# Patient Record
Sex: FEMALE | Race: WHITE | Employment: OTHER | URBAN - METROPOLITAN AREA
[De-identification: names, ages, dates, MRNs, and addresses within clinical notes are randomized per-mention and may not be internally consistent; named-entity substitution may affect disease eponyms.]

---

## 2018-05-26 LAB
ABSOL LYMPHOCYTES (HISTORICAL): 1.6 K/UL (ref 0.5–4)
ALBUMIN SERPL BCP-MCNC: 4.1 G/DL (ref 3–5.2)
ALP SERPL-CCNC: 112 U/L (ref 43–122)
ALT SERPL W P-5'-P-CCNC: 22 U/L (ref 9–52)
ANION GAP SERPL CALCULATED.3IONS-SCNC: 13 MMOL/L (ref 5–14)
AST SERPL W P-5'-P-CCNC: 18 U/L (ref 14–36)
BASOPHILS # BLD AUTO: 0 K/UL (ref 0–0.1)
BASOPHILS # BLD AUTO: 1 % (ref 0–1)
BILIRUB SERPL-MCNC: 0.5 MG/DL
BUN SERPL-MCNC: 9 MG/DL (ref 5–25)
CALCIUM SERPL-MCNC: 10.1 MG/DL (ref 8.4–10.2)
CHLORIDE SERPL-SCNC: 104 MEQ/L (ref 97–108)
CHOLEST SERPL-MCNC: 178 MG/DL
CHOLEST/HDLC SERPL: 2.6 {RATIO}
CO2 SERPL-SCNC: 25 MMOL/L (ref 22–30)
CREATINE, SERUM (HISTORICAL): 0.86 MG/DL (ref 0.6–1.2)
DEPRECATED RDW RBC AUTO: 14.9 %
EGFR (HISTORICAL): >60 ML/MIN/1.73 M2
EOSINOPHIL # BLD AUTO: 0.2 K/UL (ref 0–0.4)
EOSINOPHIL NFR BLD AUTO: 4 % (ref 0–6)
GLUCOSE FASTING (HISTORICAL): 96 MG/DL (ref 70–99)
HCT VFR BLD AUTO: 42.4 % (ref 36–46)
HDLC SERPL-MCNC: 68 MG/DL
HGB BLD-MCNC: 13.9 G/DL (ref 12–16)
LDL/HDL RATIO (HISTORICAL): 1.2
LDLC SERPL CALC-MCNC: 82 MG/DL
LYMPHOCYTES NFR BLD AUTO: 27 % (ref 25–45)
MCH RBC QN AUTO: 27.8 PG (ref 26–34)
MCHC RBC AUTO-ENTMCNC: 32.9 % (ref 31–36)
MCV RBC AUTO: 85 FL (ref 80–100)
MONOCYTES # BLD AUTO: 0.5 K/UL (ref 0.2–0.9)
MONOCYTES NFR BLD AUTO: 8 % (ref 1–10)
NEUTROPHILS ABS COUNT (HISTORICAL): 3.6 K/UL (ref 1.8–7.8)
NEUTS SEG NFR BLD AUTO: 60 % (ref 45–65)
PLATELET # BLD AUTO: 301 K/MCL (ref 150–450)
POTASSIUM SERPL-SCNC: 4.1 MEQ/L (ref 3.6–5)
RBC # BLD AUTO: 5.01 M/MCL (ref 4–5.2)
SODIUM SERPL-SCNC: 142 MEQ/L (ref 137–147)
TOTAL PROTEIN (HISTORICAL): 7.2 G/DL (ref 5.9–8.4)
TRIGL SERPL-MCNC: 138 MG/DL
TSH SERPL DL<=0.05 MIU/L-ACNC: 1.2 UIU/ML (ref 0.47–4.68)
VITAMIN D25-HYDROXY (HISTORICAL): 36.2 NG/ML (ref 30–100)
VLDLC SERPL CALC-MCNC: 28 MG/DL (ref 0–40)
WBC # BLD AUTO: 6 K/MCL (ref 4.5–11)

## 2018-12-07 ENCOUNTER — OFFICE VISIT (OUTPATIENT)
Dept: FAMILY MEDICINE CLINIC | Facility: CLINIC | Age: 78
End: 2018-12-07
Payer: COMMERCIAL

## 2018-12-07 VITALS
WEIGHT: 222 LBS | SYSTOLIC BLOOD PRESSURE: 142 MMHG | TEMPERATURE: 97.7 F | DIASTOLIC BLOOD PRESSURE: 76 MMHG | RESPIRATION RATE: 16 BRPM | HEART RATE: 109 BPM | OXYGEN SATURATION: 96 % | HEIGHT: 62 IN | BODY MASS INDEX: 40.85 KG/M2

## 2018-12-07 DIAGNOSIS — E55.9 VITAMIN D INSUFFICIENCY: ICD-10-CM

## 2018-12-07 DIAGNOSIS — Z23 IMMUNIZATION DUE: ICD-10-CM

## 2018-12-07 DIAGNOSIS — Z00.00 HEALTHCARE MAINTENANCE: ICD-10-CM

## 2018-12-07 DIAGNOSIS — K21.9 GASTROESOPHAGEAL REFLUX DISEASE WITHOUT ESOPHAGITIS: ICD-10-CM

## 2018-12-07 DIAGNOSIS — F41.9 ANXIETY: Primary | ICD-10-CM

## 2018-12-07 DIAGNOSIS — E78.49 OTHER HYPERLIPIDEMIA: ICD-10-CM

## 2018-12-07 PROBLEM — J30.9 ALLERGIC RHINITIS: Status: ACTIVE | Noted: 2018-12-07

## 2018-12-07 PROBLEM — M19.90 OSTEOARTHRITIS: Status: ACTIVE | Noted: 2018-12-07

## 2018-12-07 PROCEDURE — 1160F RVW MEDS BY RX/DR IN RCRD: CPT | Performed by: FAMILY MEDICINE

## 2018-12-07 PROCEDURE — G0008 ADMIN INFLUENZA VIRUS VAC: HCPCS

## 2018-12-07 PROCEDURE — 99214 OFFICE O/P EST MOD 30 MIN: CPT | Performed by: FAMILY MEDICINE

## 2018-12-07 PROCEDURE — 1125F AMNT PAIN NOTED PAIN PRSNT: CPT

## 2018-12-07 PROCEDURE — G0439 PPPS, SUBSEQ VISIT: HCPCS | Performed by: FAMILY MEDICINE

## 2018-12-07 PROCEDURE — 90662 IIV NO PRSV INCREASED AG IM: CPT

## 2018-12-07 PROCEDURE — 4040F PNEUMOC VAC/ADMIN/RCVD: CPT

## 2018-12-07 PROCEDURE — 3008F BODY MASS INDEX DOCD: CPT | Performed by: FAMILY MEDICINE

## 2018-12-07 PROCEDURE — 1170F FXNL STATUS ASSESSED: CPT

## 2018-12-07 PROCEDURE — 1160F RVW MEDS BY RX/DR IN RCRD: CPT

## 2018-12-07 RX ORDER — ALPRAZOLAM 0.25 MG/1
0.25 TABLET ORAL 2 TIMES DAILY
Qty: 120 TABLET | Refills: 0 | Status: SHIPPED | OUTPATIENT
Start: 2018-12-07 | End: 2019-06-27 | Stop reason: SDUPTHER

## 2018-12-07 RX ORDER — ZOLPIDEM TARTRATE 10 MG/1
TABLET ORAL
COMMUNITY
End: 2018-12-07 | Stop reason: ALTCHOICE

## 2018-12-07 RX ORDER — PAROXETINE HYDROCHLORIDE 40 MG/1
TABLET, FILM COATED ORAL EVERY 24 HOURS
COMMUNITY
Start: 2018-04-16 | End: 2019-11-16 | Stop reason: SDUPTHER

## 2018-12-07 RX ORDER — HYDROCODONE BITARTRATE AND ACETAMINOPHEN 7.5; 325 MG/1; MG/1
TABLET ORAL EVERY 6 HOURS
COMMUNITY
Start: 2015-07-02

## 2018-12-07 RX ORDER — ALPRAZOLAM 0.25 MG/1
TABLET ORAL
COMMUNITY
Start: 2018-06-01 | End: 2018-12-07 | Stop reason: SDUPTHER

## 2018-12-07 RX ORDER — MAG HYDROX/ALUMINUM HYD/SIMETH 400-400-40
SUSPENSION, ORAL (FINAL DOSE FORM) ORAL
COMMUNITY

## 2018-12-07 RX ORDER — ERGOCALCIFEROL (VITAMIN D2) 1250 MCG
CAPSULE ORAL
COMMUNITY
Start: 2017-05-23 | End: 2018-12-07 | Stop reason: ALTCHOICE

## 2018-12-07 RX ORDER — ESOMEPRAZOLE MAGNESIUM 40 MG/1
CAPSULE, DELAYED RELEASE ORAL EVERY 24 HOURS
COMMUNITY
Start: 2015-02-11 | End: 2021-12-17 | Stop reason: ALTCHOICE

## 2018-12-07 RX ORDER — ROSUVASTATIN CALCIUM 10 MG/1
TABLET, COATED ORAL EVERY 24 HOURS
COMMUNITY
Start: 2018-06-08 | End: 2019-08-05 | Stop reason: SDUPTHER

## 2018-12-07 NOTE — PROGRESS NOTES
Assessment and Plan:  Problem List Items Addressed This Visit     Anxiety - Primary    Relevant Medications    ALPRAZolam (XANAX) 0 25 mg tablet    Other Relevant Orders    CBC and differential    Comprehensive metabolic panel    TSH, 3rd generation with Free T4 reflex    Gastroesophageal reflux disease    Relevant Medications    esomeprazole (NEXIUM) 40 MG capsule    Hyperlipidemia    Relevant Medications    rosuvastatin (CRESTOR) 10 MG tablet    Other Relevant Orders    Lipid Panel with Direct LDL reflex    Immunization due    Relevant Orders    PREFERRED: influenza vaccine, 4791-9834, high-dose, PF 0 5 mL, for patients 65 yr+ (FLUZONE HIGH-DOSE) (Completed)    Vitamin D insufficiency    Relevant Orders    Vitamin D 25 hydroxy    Healthcare maintenance        Health Maintenance Due   Topic Date Due    DTaP,Tdap,and Td Vaccines (1 - Tdap) 06[birth year]/1963    Fall Risk  06/09/2007    Urinary Incontinence Screening  06/09/2007    Pneumococcal PPSV23/PCV13 65+ Years / Low and Medium Risk (2 of 2 - PCV13) 10/30/2009         HPI:  Patient Active Problem List   Diagnosis    Allergic rhinitis    Anxiety    Back pain    Cervical polyp    Chronic pain disorder    Depression    Osteoarthritis    Gastroesophageal reflux disease    Hemorrhoids    Hiatal hernia    Hyperlipidemia    Lumbar degenerative disc disease    Obesity    Osteoporosis    Sjogren's syndrome ([address] )    Sleep apnea    Tachycardia    Urge incontinence    Uterovaginal prolapse, incomplete    Immunization due    Vitamin D insufficiency    Healthcare maintenance     Past Medical History:   Diagnosis Date    Anxiety     Depression     GERD (gastroesophageal reflux disease)     Hyperlipidemia     Kidney stone     Vitamin D deficiency      Past Surgical History:   Procedure Laterality Date    CATARACT EXTRACTION      CYSTOTOMY      with direct removal of calculus    HEMORROIDECTOMY  2010    KIDNEY STONE SURGERY      requiring open removal on the left side    LITHOTRIPSY      POLYPECTOMY      REPLACEMENT TOTAL KNEE  2011    SINUS SURGERY      TONSILLECTOMY AND ADENOIDECTOMY       Family History   Problem Relation Age of Onset    Heart attack Mother     Diverticulosis Father     Other Brother         back surgery and one brain surgery    Asthma Daughter      History   Smoking Status    Former Smoker    Types: Cigarettes    Quit date: 1995   Smokeless Tobacco    Never Used     Comment: no passive smoke exposure     History   Alcohol Use No      History   Drug Use No         Current Outpatient Prescriptions   Medication Sig Dispense Refill    ALPRAZolam (XANAX) 0 25 mg tablet Take 1 tablet (0 25 mg total) by mouth 2 (two) times a day 120 tablet 0    Cholecalciferol (VITAMIN D3) 5000 units CAPS Take by mouth      Ergocalciferol (VITAMIN D2 PO) Take 1 tablet by mouth      esomeprazole (NEXIUM) 40 MG capsule every 24 hours      HYDROcodone-acetaminophen (NORCO) 7 5-325 mg per tablet every 6 (six) hours      PARoxetine (PAXIL) 40 MG tablet every 24 hours      rosuvastatin (CRESTOR) 10 MG tablet every 24 hours       No current facility-administered medications for this visit  Allergies   Allergen Reactions    Ciprofloxacin      Other reaction(s): headache,dizziness, chest pain    Acetaminophen      Pt states she currently takes without issue    Propoxyphene      Immunization History   Administered Date(s) Administered    Influenza TIV (IM) 10/12/2010    Influenza, high dose seasonal 0 5 mL 12/07/2018    Pneumococcal Polysaccharide PPV23 02/12/1999, 10/30/2008       Patient Care Team:  Courtney Betancur MD as PCP - General (Family Medicine)  Joan Chakraborty MD    Medicare Screening Tests and Risk Assessments:  Arian So is here for her Subsequent Wellness visit  Last Medicare Wellness visit information reviewed, patient interviewed and updates made to the record today       Health Risk Assessment:  Patient rates overall health as good  Patient feels that their physical health rating is Same  Eyesight was rated as Same  Hearing was rated as Same  Patient feels that their emotional and mental health rating is Same  Pain experienced by patient in the last 7 days has been Some  Patient's pain rating has been 2/10  Patient states that she has experienced no weight loss or gain in last 6 months  (Additional comments: Pt's  starting cancer treatments this week)    Emotional/Mental Health:  Patient has been feeling nervous/anxious  PHQ-9 Depression Screening:    Frequency of the following problems over the past two weeks:      1  Little interest or pleasure in doing things: 3 - nearly every day      2  Feeling down, depressed, or hopeless: 3 - nearly every day      3  Trouble falling or staying asleep, or sleeping too much: 3 - nearly every day      4  Feeling tired or having little energy: 3 - nearly every day      5  Poor appetite or overeatin - more than half the days      6  Feeling bad about yourself - or that you are a failure or have let yourself or your family down: 3 - nearly every day      7  Trouble concentrating on things, such as reading the newspaper or watching television: 0 - not at all      8  Moving or speaking so slowly that other people could have noticed  Or the opposite - being so fidgety or restless that you have been moving around a lot more than usual: 0 - not at all      9  Thoughts that you would be better off dead, or of hurting yourself in some way: 0 - not at all  PHQ-2 Score: 6  PHQ-9 Score: 17    Broken Bones/Falls: Fall Risk Assessment:    In the past year, patient has experienced: History of falling in past year     Number of falls: 1  Patient feels unsteady standing  Patient is not taking medication that can cause feelings of lightheadedness or tiredness  Patient often has no need to rush to the toilet  Chronic conditions that may contribute to falls arthritis       Bladder/Bowel:  Patient has not leaked urine accidently in the last six months  Patient reports no loss of bowel control  Immunizations:  Patient has had a flu vaccination within the last year  Patient has received a pneumonia shot  Patient has received a shingles shot  Home Safety:  Patient has trouble with stairs inside or outside of their home  Patient currently reports that there are no safety hazards present in home, working smoke alarms, working carbon monoxide detectors  Preventative Screenings:   Breast cancer screening performed, colon cancer screen completed, cholesterol screen completed, glaucoma eye exam completed,     Nutrition:  Current diet: Regular with servings of the following:    Medications:  Patient is not currently taking any over-the-counter supplements  Patient is able to manage medications  (Additional Comments:  manages meds)Lifestyle Choices:  Patient reports no tobacco use  Patient has not smoked or used tobacco in the past   Patient reports no alcohol use  Patient does not drive a vehicle  Patient wears seat belt  Current level of exercise of physical activity described by patient as:  none  Activities of Daily Living:  Can get out of bed by his or her self, able to dress self, able to make own meals, able to do own shopping, able to bathe self, can do own laundry/housekeeping, can manage own money, pay bills and track expenses    Previous Hospitalizations:  No hospitalization or ED visit in past 12 months        Advanced Directives:  Patient has decided on a power of   Patient has spoken to designated power of   Patient has completed advanced directive          Preventative Screening/Counseling:      Cardiovascular:      General: Risks and Benefits Discussed and Screening Current          Diabetes:      General: Risks and Benefits Discussed and Screening Current          Colorectal Cancer:      General: Risks and Benefits Discussed      Counseling: high fiber diet          Breast Cancer:      General: Risks and Benefits Discussed          Cervical Cancer:      General: Risks and Benefits Discussed and Patient Declines          Osteoporosis:      General: Risks and Benefits Discussed and Patient Declines          AAA:      General: Risks and Benefits Discussed and Patient Declines          Glaucoma:      General: Risks and Benefits Discussed and Screening Current          HIV:      General: Patient Declines and Risks and Benefits Discussed          Hepatitis C:      General: Risks and Benefits Discussed        Advanced Directives:   Patient has living will for healthcare, has durable POA for healthcare, patient has an advanced directive  No exam data present    Physical Exam:  Review of Systems   Gastrointestinal: Negative for bowel incontinence  Musculoskeletal: Positive for arthritis  Psychiatric/Behavioral: The patient is nervous/anxious  Vitals:    12/07/18 1148   BP: 142/76   BP Location: Right arm   Patient Position: Sitting   Cuff Size: Large   Pulse: (!) 109   Resp: 16   Temp: 97 7 °F (36 5 °C)   TempSrc: Tympanic   SpO2: 96%   Weight: 101 kg (222 lb)   Height: 5' 2" (1 575 m)   Body mass index is 40 6 kg/m²      Physical Exam

## 2018-12-07 NOTE — PROGRESS NOTES
Assessment/Plan:     Diagnoses and all orders for this visit:    Anxiety  Comments:  Stable  Continue same  Will continue to monitor  Orders:  -     ALPRAZolam (XANAX) 0 25 mg tablet; Take 1 tablet (0 25 mg total) by mouth 2 (two) times a day  -     CBC and differential; Future  -     Comprehensive metabolic panel; Future  -     TSH, 3rd generation with Free T4 reflex; Future    Gastroesophageal reflux disease without esophagitis  Comments:  Stable  Continue same  Will continue to monitor    Immunization due  -     PREFERRED: influenza vaccine, 1555-3310, high-dose, PF 0 5 mL, for patients 65 yr+ (FLUZONE HIGH-DOSE)    Other hyperlipidemia  Comments: It was discussed about low-fat diet  We will continue to monitor her fasting lipid profile  Orders:  -     Lipid Panel with Direct LDL reflex; Future    Vitamin D insufficiency  Comments:  Continue same  Will continue to monitor  Orders:  -     Vitamin D 25 hydroxy; Future    Healthcare maintenance  Comments: It was discussed about immunizations, diet, exercise and safety measures    Other orders  -     Discontinue: ALPRAZolam (XANAX) 0 25 mg tablet; take 1 tablet by oral route twice a day  -     rosuvastatin (CRESTOR) 10 MG tablet; every 24 hours  -     Discontinue: ergocalciferol (ERGOCALCIFEROL) 91915 units capsule; take 1 capsule by oral route  every week  -     HYDROcodone-acetaminophen (NORCO) 7 5-325 mg per tablet; every 6 (six) hours  -     esomeprazole (NEXIUM) 40 MG capsule; every 24 hours  -     PARoxetine (PAXIL) 40 MG tablet; every 24 hours  -     Discontinue: zolpidem (AMBIEN) 10 mg tablet; Take by mouth  -     Cholecalciferol (VITAMIN D3) 5000 units CAPS; Take by mouth  -     Ergocalciferol (VITAMIN D2 PO); Take 1 tablet by mouth          There are no Patient Instructions on file for this visit  Return in about 6 months (around 6/7/2019)  Subjective:      Patient ID: Amee Garcia is a 70 y o  female      Chief Complaint   Patient presents with   [facility] Wellness Visit       She is here today for follow-up multiple medical problems  She has been taking all her medications  She denies any side effects from her medications  The following portions of the patient's history were reviewed and updated as appropriate: allergies, current medications, past family history, past medical history, past social history, past surgical history and problem list     Review of Systems   Constitutional: Negative for chills and fever  HENT: Negative for trouble swallowing  Eyes: Negative for visual disturbance  Respiratory: Negative for cough and shortness of breath  Cardiovascular: Negative for chest pain, palpitations and leg swelling  Gastrointestinal: Negative for abdominal pain, constipation and diarrhea  Endocrine: Negative for cold intolerance and heat intolerance  Genitourinary: Negative for difficulty urinating and dysuria  Musculoskeletal: Negative for gait problem  Skin: Negative for rash  Neurological: Negative for dizziness, tremors, seizures and headaches  Hematological: Negative for adenopathy  Psychiatric/Behavioral: Negative for behavioral problems  Current Outpatient Prescriptions   Medication Sig Dispense Refill    ALPRAZolam (XANAX) 0 25 mg tablet Take 1 tablet (0 25 mg total) by mouth 2 (two) times a day 120 tablet 0    Cholecalciferol (VITAMIN D3) 5000 units CAPS Take by mouth      Ergocalciferol (VITAMIN D2 PO) Take 1 tablet by mouth      esomeprazole (NEXIUM) 40 MG capsule every 24 hours      HYDROcodone-acetaminophen (NORCO) 7 5-325 mg per tablet every 6 (six) hours      PARoxetine (PAXIL) 40 MG tablet every 24 hours      rosuvastatin (CRESTOR) 10 MG tablet every 24 hours       No current facility-administered medications for this visit          Objective:    /76 (BP Location: Right arm, Patient Position: Sitting, Cuff Size: Large)   Pulse (!) 109   Temp 97 7 °F (36 5 °C) (Tympanic)   Resp 16   Ht 5' 2" (1 575 m)   Wt 101 kg (222 lb)   SpO2 96%   BMI 40 60 kg/m²        Physical Exam   Constitutional: She is oriented to person, place, and time  She appears well-developed and well-nourished  HENT:   Head: Normocephalic and atraumatic  Eyes: Pupils are equal, round, and reactive to light  EOM are normal    Neck: Normal range of motion  Neck supple  Cardiovascular: Normal rate, regular rhythm and normal heart sounds  Pulmonary/Chest: Effort normal and breath sounds normal    Abdominal: Soft  Bowel sounds are normal    Musculoskeletal: Normal range of motion  She exhibits no edema  Lymphadenopathy:     She has no cervical adenopathy  Neurological: She is alert and oriented to person, place, and time  No cranial nerve deficit  Skin: Skin is warm  Psychiatric: She has a normal mood and affect  Nursing note and vitals reviewed               Silvino Samano MD

## 2019-03-13 ENCOUNTER — OFFICE VISIT (OUTPATIENT)
Dept: PHYSICAL THERAPY | Facility: OTHER | Age: 79
End: 2019-03-13

## 2019-03-13 DIAGNOSIS — G89.29 CHRONIC BILATERAL LOW BACK PAIN, WITH SCIATICA PRESENCE UNSPECIFIED: Primary | ICD-10-CM

## 2019-03-13 DIAGNOSIS — M54.5 CHRONIC BILATERAL LOW BACK PAIN, WITH SCIATICA PRESENCE UNSPECIFIED: Primary | ICD-10-CM

## 2019-03-14 NOTE — PROGRESS NOTES
Patient came into PT without a script  Patient was going to be part of the Comp [facility]ine Program   However upon speaking with the patient and her  PT decided not to charge this visit  Essentially it turned into an information session about EPAT and other possible actions that can be taken to get the patient more active  Patient's  is very concerned about her health and he wants her to be able to get out of the house more

## 2019-06-27 DIAGNOSIS — F41.9 ANXIETY: ICD-10-CM

## 2019-06-28 ENCOUNTER — OFFICE VISIT (OUTPATIENT)
Dept: FAMILY MEDICINE CLINIC | Facility: CLINIC | Age: 79
End: 2019-06-28
Payer: COMMERCIAL

## 2019-06-28 VITALS
WEIGHT: 209 LBS | SYSTOLIC BLOOD PRESSURE: 122 MMHG | BODY MASS INDEX: 38.46 KG/M2 | RESPIRATION RATE: 16 BRPM | DIASTOLIC BLOOD PRESSURE: 72 MMHG | HEART RATE: 82 BPM | TEMPERATURE: 98.2 F | OXYGEN SATURATION: 99 % | HEIGHT: 62 IN

## 2019-06-28 DIAGNOSIS — F32.89 OTHER DEPRESSION: Primary | ICD-10-CM

## 2019-06-28 DIAGNOSIS — Z23 ENCOUNTER FOR IMMUNIZATION: ICD-10-CM

## 2019-06-28 DIAGNOSIS — E53.8 VITAMIN B12 DEFICIENCY: ICD-10-CM

## 2019-06-28 DIAGNOSIS — G89.4 CHRONIC PAIN DISORDER: ICD-10-CM

## 2019-06-28 DIAGNOSIS — E78.2 MIXED HYPERLIPIDEMIA: ICD-10-CM

## 2019-06-28 DIAGNOSIS — R41.3 MEMORY DEFICIT: ICD-10-CM

## 2019-06-28 PROBLEM — F19.90 CURRENT DRUG USE: Status: ACTIVE | Noted: 2017-09-12

## 2019-06-28 PROCEDURE — 1036F TOBACCO NON-USER: CPT | Performed by: FAMILY MEDICINE

## 2019-06-28 PROCEDURE — 90471 IMMUNIZATION ADMIN: CPT

## 2019-06-28 PROCEDURE — 90715 TDAP VACCINE 7 YRS/> IM: CPT

## 2019-06-28 PROCEDURE — 3725F SCREEN DEPRESSION PERFORMED: CPT | Performed by: FAMILY MEDICINE

## 2019-06-28 PROCEDURE — 1160F RVW MEDS BY RX/DR IN RCRD: CPT | Performed by: FAMILY MEDICINE

## 2019-06-28 PROCEDURE — 99214 OFFICE O/P EST MOD 30 MIN: CPT | Performed by: FAMILY MEDICINE

## 2019-06-28 RX ORDER — ALPRAZOLAM 0.25 MG/1
TABLET ORAL
Qty: 120 TABLET | Refills: 0 | Status: SHIPPED | OUTPATIENT
Start: 2019-06-28 | End: 2020-05-08 | Stop reason: SDUPTHER

## 2019-08-05 DIAGNOSIS — E78.2 MIXED HYPERLIPIDEMIA: Primary | ICD-10-CM

## 2019-08-07 RX ORDER — ROSUVASTATIN CALCIUM 10 MG/1
10 TABLET, COATED ORAL DAILY
Qty: 90 TABLET | Refills: 0 | Status: SHIPPED | OUTPATIENT
Start: 2019-08-07 | End: 2019-11-16 | Stop reason: SDUPTHER

## 2019-11-15 DIAGNOSIS — F32.89 OTHER DEPRESSION: Primary | ICD-10-CM

## 2019-11-15 NOTE — TELEPHONE ENCOUNTER
Message for refill of paroxetine which is now 20mg per Dr Elieser Ramachandran  They were cutting the 40 in half and rosuvastatin

## 2019-11-16 DIAGNOSIS — E78.2 MIXED HYPERLIPIDEMIA: ICD-10-CM

## 2019-11-16 RX ORDER — ROSUVASTATIN CALCIUM 10 MG/1
10 TABLET, COATED ORAL DAILY
Qty: 90 TABLET | Refills: 0 | Status: SHIPPED | OUTPATIENT
Start: 2019-11-16 | End: 2020-03-17

## 2019-11-16 RX ORDER — PAROXETINE HYDROCHLORIDE 40 MG/1
20 TABLET, FILM COATED ORAL EVERY 24 HOURS
Qty: 30 TABLET | Refills: 0 | Status: SHIPPED | OUTPATIENT
Start: 2019-11-16 | End: 2019-11-19 | Stop reason: SDUPTHER

## 2019-11-16 NOTE — TELEPHONE ENCOUNTER
rx sent to provider  Please note dose change from 40mg to 20mg per Dr Cletus Primrose note  Pt last seen 6/28/19 and is due for f/u appt  l/m for pt to call office to sched f/u appt

## 2019-11-18 ENCOUNTER — TELEPHONE (OUTPATIENT)
Dept: FAMILY MEDICINE CLINIC | Facility: CLINIC | Age: 79
End: 2019-11-18

## 2019-11-18 NOTE — TELEPHONE ENCOUNTER
Pt paroxetine needs an autorization   I did go onto cover my meds and sent a prior auth to [facility] for approval

## 2019-11-19 ENCOUNTER — OFFICE VISIT (OUTPATIENT)
Dept: FAMILY MEDICINE CLINIC | Facility: CLINIC | Age: 79
End: 2019-11-19
Payer: COMMERCIAL

## 2019-11-19 VITALS
RESPIRATION RATE: 16 BRPM | HEIGHT: 62 IN | BODY MASS INDEX: 39.75 KG/M2 | HEART RATE: 76 BPM | TEMPERATURE: 97.9 F | SYSTOLIC BLOOD PRESSURE: 118 MMHG | WEIGHT: 216 LBS | OXYGEN SATURATION: 97 % | DIASTOLIC BLOOD PRESSURE: 72 MMHG

## 2019-11-19 DIAGNOSIS — E78.2 MIXED HYPERLIPIDEMIA: ICD-10-CM

## 2019-11-19 DIAGNOSIS — F41.1 ANXIETY STATE: ICD-10-CM

## 2019-11-19 DIAGNOSIS — M51.37 DEGENERATION OF LUMBOSACRAL INTERVERTEBRAL DISC: ICD-10-CM

## 2019-11-19 DIAGNOSIS — F32.89 OTHER DEPRESSION: Primary | ICD-10-CM

## 2019-11-19 DIAGNOSIS — K21.9 GASTROESOPHAGEAL REFLUX DISEASE WITHOUT ESOPHAGITIS: ICD-10-CM

## 2019-11-19 PROCEDURE — 1160F RVW MEDS BY RX/DR IN RCRD: CPT | Performed by: FAMILY MEDICINE

## 2019-11-19 PROCEDURE — 99214 OFFICE O/P EST MOD 30 MIN: CPT | Performed by: FAMILY MEDICINE

## 2019-11-19 PROCEDURE — 1036F TOBACCO NON-USER: CPT | Performed by: FAMILY MEDICINE

## 2019-11-19 RX ORDER — PAROXETINE 10 MG/1
20 TABLET, FILM COATED ORAL EVERY 24 HOURS
Qty: 60 TABLET | Refills: 3 | Status: SHIPPED | OUTPATIENT
Start: 2019-11-19 | End: 2020-04-19

## 2019-11-19 NOTE — ASSESSMENT & PLAN NOTE
Stable on Paxil 20 mg daily  I am going to cut down to 10 mg  She was told to try to taper it down to 10 mg daily by alternating 20 and then every other day for 2 weeks and then 10 mg daily  Come back in 1 month

## 2019-11-19 NOTE — PROGRESS NOTES
Assessment/Plan:  Depression  Stable on Paxil 20 mg daily  I am going to cut down to 10 mg  She was told to try to taper it down to 10 mg daily by alternating 20 and then every other day for 2 weeks and then 10 mg daily  Come back in 1 month  Hyperlipidemia  Stable  Continue same  It was discussed about low-fat diet  Will continue to monitor  Anxiety state  Stable  Continue same  Will continue to monitor  Degeneration of lumbosacral intervertebral disc  Stable  Continue same  We will continue to monitor  Diagnoses and all orders for this visit:    Other depression  -     PARoxetine (PAXIL) 10 mg tablet; Take 2 tablets (20 mg total) by mouth every 24 hours    Gastroesophageal reflux disease without esophagitis    Mixed hyperlipidemia    Degeneration of lumbosacral intervertebral disc    Anxiety state          There are no Patient Instructions on file for this visit  Return in about 23 days (around 12/12/2019)  Subjective:      Patient ID: Castillo Herzog is a 70 y o  female  Chief Complaint   Patient presents with    Depression    Hyperlipidemia    GERD       For follow-up multiple medical problems  She has been taking her medications  She has been cutting down on her Paxil to 20 mg daily  She has been doing well on the new dose  She has been more active since she cut down on her dose  Denies feeling depressed  She did not get her blood work done yet  The following portions of the patient's history were reviewed and updated as appropriate: allergies, current medications, past family history, past medical history, past social history, past surgical history and problem list     Review of Systems   Constitutional: Negative for chills and fever  HENT: Negative for trouble swallowing  Eyes: Negative for visual disturbance  Respiratory: Negative for cough and shortness of breath  Cardiovascular: Negative for chest pain, palpitations and leg swelling  Gastrointestinal: Negative for abdominal pain, constipation and diarrhea  Endocrine: Negative for cold intolerance and heat intolerance  Genitourinary: Negative for difficulty urinating and dysuria  Musculoskeletal: Positive for arthralgias and back pain  Negative for gait problem  Skin: Negative for rash  Neurological: Negative for dizziness, tremors, seizures and headaches  Hematological: Negative for adenopathy  Psychiatric/Behavioral: Negative for behavioral problems  Current Outpatient Medications   Medication Sig Dispense Refill    ALPRAZolam (XANAX) 0 25 mg tablet TAKE ONE TABLET BY MOUTH TWICE A  tablet 0    Cholecalciferol (VITAMIN D3) 5000 units CAPS Take by mouth      HYDROcodone-acetaminophen (NORCO) 7 5-325 mg per tablet every 6 (six) hours      PARoxetine (PAXIL) 10 mg tablet Take 2 tablets (20 mg total) by mouth every 24 hours 60 tablet 3    rosuvastatin (CRESTOR) 10 MG tablet Take 1 tablet (10 mg total) by mouth daily 90 tablet 0    esomeprazole (NEXIUM) 40 MG capsule every 24 hours       No current facility-administered medications for this visit  Objective:    /72 (BP Location: Left arm, Patient Position: Sitting, Cuff Size: Standard)   Pulse 76   Temp 97 9 °F (36 6 °C) (Tympanic)   Resp 16   Ht 5' 2" (1 575 m)   Wt 98 kg (216 lb)   SpO2 97%   BMI 39 51 kg/m²        Physical Exam   Constitutional: She is oriented to person, place, and time  She appears well-developed and well-nourished  HENT:   Head: Normocephalic and atraumatic  Eyes: Pupils are equal, round, and reactive to light  EOM are normal    Neck: Normal range of motion  Neck supple  Cardiovascular: Normal rate, regular rhythm and normal heart sounds  Pulmonary/Chest: Effort normal and breath sounds normal    Abdominal: Soft  Bowel sounds are normal    Musculoskeletal: Normal range of motion  She exhibits no edema  Lymphadenopathy:     She has no cervical adenopathy  Neurological: She is alert and oriented to person, place, and time  No cranial nerve deficit  Skin: Skin is warm  Psychiatric: She has a normal mood and affect  Nursing note and vitals reviewed               Owen Cole MD

## 2019-12-20 ENCOUNTER — APPOINTMENT (OUTPATIENT)
Dept: LAB | Facility: IMAGING CENTER | Age: 79
End: 2019-12-20
Payer: COMMERCIAL

## 2019-12-20 ENCOUNTER — TRANSCRIBE ORDERS (OUTPATIENT)
Dept: ADMINISTRATIVE | Facility: HOSPITAL | Age: 79
End: 2019-12-20

## 2019-12-20 DIAGNOSIS — E55.9 VITAMIN D INSUFFICIENCY: ICD-10-CM

## 2019-12-20 DIAGNOSIS — E78.49 OTHER HYPERLIPIDEMIA: Primary | ICD-10-CM

## 2019-12-20 DIAGNOSIS — I10 ESSENTIAL HYPERTENSION: ICD-10-CM

## 2019-12-20 DIAGNOSIS — E78.49 OTHER HYPERLIPIDEMIA: ICD-10-CM

## 2019-12-20 DIAGNOSIS — E53.8 VITAMIN B12 DEFICIENCY: ICD-10-CM

## 2019-12-20 LAB
25(OH)D3 SERPL-MCNC: 27.4 NG/ML (ref 30–100)
ALBUMIN SERPL BCP-MCNC: 3.7 G/DL (ref 3.5–5)
ALP SERPL-CCNC: 120 U/L (ref 46–116)
ALT SERPL W P-5'-P-CCNC: 17 U/L (ref 12–78)
ANION GAP SERPL CALCULATED.3IONS-SCNC: 6 MMOL/L (ref 4–13)
AST SERPL W P-5'-P-CCNC: 15 U/L (ref 5–45)
BASOPHILS # BLD AUTO: 0.04 THOUSANDS/ΜL (ref 0–0.1)
BASOPHILS NFR BLD AUTO: 1 % (ref 0–1)
BILIRUB SERPL-MCNC: 0.65 MG/DL (ref 0.2–1)
BUN SERPL-MCNC: 10 MG/DL (ref 5–25)
CALCIUM SERPL-MCNC: 9.8 MG/DL (ref 8.3–10.1)
CHLORIDE SERPL-SCNC: 108 MMOL/L (ref 100–108)
CHOLEST SERPL-MCNC: 176 MG/DL (ref 50–200)
CO2 SERPL-SCNC: 26 MMOL/L (ref 21–32)
CREAT SERPL-MCNC: 0.83 MG/DL (ref 0.6–1.3)
EOSINOPHIL # BLD AUTO: 0.07 THOUSAND/ΜL (ref 0–0.61)
EOSINOPHIL NFR BLD AUTO: 1 % (ref 0–6)
ERYTHROCYTE [DISTWIDTH] IN BLOOD BY AUTOMATED COUNT: 14.3 % (ref 11.6–15.1)
GFR SERPL CREATININE-BSD FRML MDRD: 68 ML/MIN/1.73SQ M
GLUCOSE P FAST SERPL-MCNC: 88 MG/DL (ref 65–99)
HCT VFR BLD AUTO: 41.5 % (ref 34.8–46.1)
HDLC SERPL-MCNC: 76 MG/DL
HGB BLD-MCNC: 12.9 G/DL (ref 11.5–15.4)
IMM GRANULOCYTES # BLD AUTO: 0.02 THOUSAND/UL (ref 0–0.2)
IMM GRANULOCYTES NFR BLD AUTO: 0 % (ref 0–2)
LDLC SERPL CALC-MCNC: 78 MG/DL (ref 0–100)
LYMPHOCYTES # BLD AUTO: 1.77 THOUSANDS/ΜL (ref 0.6–4.47)
LYMPHOCYTES NFR BLD AUTO: 28 % (ref 14–44)
MCH RBC QN AUTO: 27.7 PG (ref 26.8–34.3)
MCHC RBC AUTO-ENTMCNC: 31.1 G/DL (ref 31.4–37.4)
MCV RBC AUTO: 89 FL (ref 82–98)
MONOCYTES # BLD AUTO: 0.62 THOUSAND/ΜL (ref 0.17–1.22)
MONOCYTES NFR BLD AUTO: 10 % (ref 4–12)
NEUTROPHILS # BLD AUTO: 3.89 THOUSANDS/ΜL (ref 1.85–7.62)
NEUTS SEG NFR BLD AUTO: 60 % (ref 43–75)
NRBC BLD AUTO-RTO: 0 /100 WBCS
PLATELET # BLD AUTO: 259 THOUSANDS/UL (ref 149–390)
PMV BLD AUTO: 11.5 FL (ref 8.9–12.7)
POTASSIUM SERPL-SCNC: 3.6 MMOL/L (ref 3.5–5.3)
PROT SERPL-MCNC: 7.5 G/DL (ref 6.4–8.2)
RBC # BLD AUTO: 4.65 MILLION/UL (ref 3.81–5.12)
SODIUM SERPL-SCNC: 140 MMOL/L (ref 136–145)
TRIGL SERPL-MCNC: 112 MG/DL
TSH SERPL DL<=0.05 MIU/L-ACNC: 1.31 UIU/ML (ref 0.36–3.74)
VIT B12 SERPL-MCNC: 215 PG/ML (ref 100–900)
WBC # BLD AUTO: 6.41 THOUSAND/UL (ref 4.31–10.16)

## 2019-12-20 PROCEDURE — 80061 LIPID PANEL: CPT

## 2019-12-20 PROCEDURE — 84443 ASSAY THYROID STIM HORMONE: CPT

## 2019-12-20 PROCEDURE — 82306 VITAMIN D 25 HYDROXY: CPT

## 2019-12-20 PROCEDURE — 36415 COLL VENOUS BLD VENIPUNCTURE: CPT

## 2019-12-20 PROCEDURE — 85025 COMPLETE CBC W/AUTO DIFF WBC: CPT

## 2019-12-20 PROCEDURE — 80053 COMPREHEN METABOLIC PANEL: CPT

## 2019-12-20 PROCEDURE — 82607 VITAMIN B-12: CPT

## 2019-12-24 ENCOUNTER — TELEPHONE (OUTPATIENT)
Dept: FAMILY MEDICINE CLINIC | Facility: CLINIC | Age: 79
End: 2019-12-24

## 2019-12-24 NOTE — TELEPHONE ENCOUNTER
----- Message from Alex Parada MD sent at 12/22/2019 10:29 AM EST -----  BW showed slightly low vit D   All the rest of BW came back normal

## 2020-03-16 DIAGNOSIS — E78.2 MIXED HYPERLIPIDEMIA: ICD-10-CM

## 2020-03-17 RX ORDER — ROSUVASTATIN CALCIUM 10 MG/1
TABLET, COATED ORAL
Qty: 90 TABLET | Refills: 0 | Status: SHIPPED | OUTPATIENT
Start: 2020-03-17 | End: 2020-04-19

## 2020-03-25 ENCOUNTER — TELEPHONE (OUTPATIENT)
Dept: FAMILY MEDICINE CLINIC | Facility: CLINIC | Age: 80
End: 2020-03-25

## 2020-04-07 NOTE — TELEPHONE ENCOUNTER
04/07/20 1:45 PM     Thank you for your request  Your request has been received, reviewed, and the patient chart updated  The PCP has successfully been removed with a patient attribution note  This message will now be completed      Thank you  Giles Nails

## 2020-04-17 DIAGNOSIS — E78.2 MIXED HYPERLIPIDEMIA: ICD-10-CM

## 2020-04-17 DIAGNOSIS — F32.89 OTHER DEPRESSION: ICD-10-CM

## 2020-04-19 RX ORDER — ROSUVASTATIN CALCIUM 10 MG/1
TABLET, COATED ORAL
Qty: 90 TABLET | Refills: 0 | Status: SHIPPED | OUTPATIENT
Start: 2020-04-19 | End: 2020-07-15

## 2020-04-19 RX ORDER — PAROXETINE 10 MG/1
TABLET, FILM COATED ORAL
Qty: 60 TABLET | Refills: 3 | Status: SHIPPED | OUTPATIENT
Start: 2020-04-19 | End: 2020-11-13

## 2020-05-08 DIAGNOSIS — F41.9 ANXIETY: ICD-10-CM

## 2020-05-11 RX ORDER — ALPRAZOLAM 0.25 MG/1
0.25 TABLET ORAL 2 TIMES DAILY
Qty: 120 TABLET | Refills: 0 | Status: SHIPPED | OUTPATIENT
Start: 2020-05-11 | End: 2021-01-01 | Stop reason: SDUPTHER

## 2020-07-15 DIAGNOSIS — E78.2 MIXED HYPERLIPIDEMIA: ICD-10-CM

## 2020-07-15 RX ORDER — ROSUVASTATIN CALCIUM 10 MG/1
TABLET, COATED ORAL
Qty: 90 TABLET | Refills: 0 | Status: SHIPPED | OUTPATIENT
Start: 2020-07-15 | End: 2021-01-21

## 2020-11-13 DIAGNOSIS — F32.89 OTHER DEPRESSION: ICD-10-CM

## 2020-11-13 RX ORDER — PAROXETINE 10 MG/1
TABLET, FILM COATED ORAL
Qty: 60 TABLET | Refills: 3 | Status: SHIPPED | OUTPATIENT
Start: 2020-11-13 | End: 2021-04-07

## 2020-12-29 ENCOUNTER — TELEPHONE (OUTPATIENT)
Dept: FAMILY MEDICINE CLINIC | Facility: CLINIC | Age: 80
End: 2020-12-29

## 2020-12-29 ENCOUNTER — TELEMEDICINE (OUTPATIENT)
Dept: FAMILY MEDICINE CLINIC | Facility: CLINIC | Age: 80
End: 2020-12-29
Payer: COMMERCIAL

## 2020-12-29 VITALS — WEIGHT: 220 LBS | HEIGHT: 62 IN | BODY MASS INDEX: 40.48 KG/M2

## 2020-12-29 DIAGNOSIS — E78.2 MIXED HYPERLIPIDEMIA: ICD-10-CM

## 2020-12-29 DIAGNOSIS — Z00.00 HEALTHCARE MAINTENANCE: ICD-10-CM

## 2020-12-29 DIAGNOSIS — Z20.822 EXPOSURE TO COVID-19 VIRUS: ICD-10-CM

## 2020-12-29 DIAGNOSIS — B34.9 VIRAL INFECTION, UNSPECIFIED: Primary | ICD-10-CM

## 2020-12-29 PROCEDURE — G0439 PPPS, SUBSEQ VISIT: HCPCS | Performed by: FAMILY MEDICINE

## 2020-12-29 PROCEDURE — 99214 OFFICE O/P EST MOD 30 MIN: CPT | Performed by: FAMILY MEDICINE

## 2021-01-01 DIAGNOSIS — F41.9 ANXIETY: ICD-10-CM

## 2021-01-04 RX ORDER — ALPRAZOLAM 0.25 MG/1
0.25 TABLET ORAL 2 TIMES DAILY
Qty: 120 TABLET | Refills: 0 | Status: SHIPPED | OUTPATIENT
Start: 2021-01-04 | End: 2021-08-09

## 2021-01-04 NOTE — TELEPHONE ENCOUNTER
Pt last visit was virtual on 20  I copied past refills into chart from [facility] web site for review   I sent to provider for approval  Last refilled 20        Report Prepared: 2021   Date Range: 2020 - 2021       Download PDF      Download CSV    Delynn Meter hoenscheid     Linked Records  Name  ID Gender Address   BARBARA HOENSCHEID 1940 1 female [address] [facility] [address]   Report Criteria  First Nameliana  Last Namehokatrinaid  [id number]/  Summary      Summary  Total Prescriptions   11   Total Private Pay   0   Total Prescribers   3   Total Pharmacies   1    Opioids* (excluding buprenorphine)  Current Qty   0 0   Current MME/day   0 0   30 Day Avg MME/day   8 67    Buprenorphine*  Current Qty   0 0   Current mg/day   0 0   30 Day Avg mg/day   0 0    Prescriptions    Filled  ID  Written  Drug  QTY  Days  Prescriber  Rx #  Pharmacy *  Refills  Daily Dose  Pymt Type      2020  1  2020  HYDROCODONE-ACETAMIN 5-325 MG  60 0  30  MA BIE  [id number]  GIANT ([id number])  0  10 0 M[place]    10/06/2020  1  10/06/2020  HYDROCODONE-ACETAMIN 5-325 MG  60 0  30  MA BIE  [id number]  [facility] ([id number])  0  10 0 MME  [place]    2020  1  2020  HYDROCODONE-ACETAMIN 5-325 MG  60 0  30  MA BIE  [id number]  [facility] ([id number])  0  10 0 MME  [place]    2020  1  2020  HYDROCODONE-ACETAMIN 5-325 MG  60 0  30  MA BIE  [id number]  [facility] ([id number])  0  10 0 MME  [place]    2020  1  2020  HYDROCODONE-ACETAMIN 5-325 MG  60 0  30  MA BIE  [id number]  [facility] ([id number])  0  10 0 MME  [place]    2020  1  2020  HYDROCODONE-ACETAMIN 5-325 MG  60 0  30  MA BIE  [id number]  [facility] ([id number])  0  10 0 MME  [place]    2020  1  2020  ALPRAZOLAM 0 25 MG TAB  120 0  60  WA ABO  [id number]  [facility] ([id number])  0   [place]    2020  1  2020  HYDROCODONE-ACETAMIN 5-325 MG  60 0  30  MA BIE  [id number]  [facility] ([id number])  0  10 0 MME  [place]    2020 1  03/12/2020  HYDROCODONE-ACETAMIN 5-325 MG  60 0  30  MA BIE  [id number]  [facility]ANT ([id number])  0  10 0 MME  [place]    02/11/2020  1  02/11/2020  HYDROCODONE-ACETAMIN 5-325 MG  60 0  30  MA BIE  [id number]  [facility]ANT ([id number])  0  10 0 MME  [place]    01/06/2020  1  01/06/2020  HYDROCODONE-ACETAMIN 5-325 MG  60 0  30  MY SEE  [id number]  [facility] ([id number])  0  10 0 MME  [place]    Filled  ID  Written  Drug  QTY  Days  Prescriber  Rx #  Pharmacy *  Refills  Daily Dose  Pymt Type     *Pharmacy is created using a combination of pharmacy name and the last four digits of the pharmacy license number  *Per CDC guidance, the MME conversion factors prescribed or provided as part of medication-assisted treatment for opioid use disorder should not be used to benchmark against dosage thresholds meant for opioids prescribed for pain  Buprenorphine products have no agreed upon morphine equivalency, and as partial opioid agonists, are not expected to be associated with overdose risk in the same dose-dependent manner as doses for full agonist opioids  MME = morphine milligram equivalents  mg = dose in milligrams     Prescribers    Name  [address]  Zip  Phone    Cherie Sweeney, [address] [address]  [phone]    Brown Betancur MD  [address] [facility]  [phone]    Sherry Prasad MD  [facility]  [phone]    [address] [facility]  Zip  Phone    [phone]  [place]  [phone]     ×   Contested Record [address]    Prescriber Delegate - Unlicensed Disclaimer:  Information from the [facility] [address] is protected health information and any information accessed must be treated as confidential  Any person who unintentionally or intentionally makes an unauthorized disclosure of information from the [facility] [address] will be subject to civil and criminal penalties as set forth in the [employer] Act 3331-772, Act of Oct  27, 2014, P L  2917  The information in the [place] [address] may contain errors resulting from the reporting of information received   Additional independent verification of patient profile information with pharmacies and prescribers may sometimes be prudent or necessary

## 2021-01-21 DIAGNOSIS — E78.2 MIXED HYPERLIPIDEMIA: ICD-10-CM

## 2021-01-21 RX ORDER — ROSUVASTATIN CALCIUM 10 MG/1
TABLET, COATED ORAL
Qty: 90 TABLET | Refills: 0 | Status: SHIPPED | OUTPATIENT
Start: 2021-01-21 | End: 2021-06-01

## 2021-02-12 DIAGNOSIS — Z23 ENCOUNTER FOR IMMUNIZATION: ICD-10-CM

## 2021-02-17 ENCOUNTER — TELEPHONE (OUTPATIENT)
Dept: FAMILY MEDICINE CLINIC | Facility: CLINIC | Age: 81
End: 2021-02-17

## 2021-02-17 NOTE — TELEPHONE ENCOUNTER
Message from daughter asking if patient has to wait 90 days for the covid vaccine?     Per Dr Mendel Shan it is OK to wait if patient wants to but she can also get it if available

## 2021-03-10 ENCOUNTER — TELEPHONE (OUTPATIENT)
Dept: FAMILY MEDICINE CLINIC | Facility: CLINIC | Age: 81
End: 2021-03-10

## 2021-03-25 ENCOUNTER — TELEPHONE (OUTPATIENT)
Dept: FAMILY MEDICINE CLINIC | Facility: CLINIC | Age: 81
End: 2021-03-25

## 2021-03-31 ENCOUNTER — TELEPHONE (OUTPATIENT)
Dept: FAMILY MEDICINE CLINIC | Facility: CLINIC | Age: 81
End: 2021-03-31

## 2021-04-06 DIAGNOSIS — F32.89 OTHER DEPRESSION: ICD-10-CM

## 2021-04-07 RX ORDER — PAROXETINE 10 MG/1
TABLET, FILM COATED ORAL
Qty: 60 TABLET | Refills: 3 | Status: SHIPPED | OUTPATIENT
Start: 2021-04-07 | End: 2021-09-16

## 2021-05-28 DIAGNOSIS — E78.2 MIXED HYPERLIPIDEMIA: ICD-10-CM

## 2021-06-01 RX ORDER — ROSUVASTATIN CALCIUM 10 MG/1
TABLET, COATED ORAL
Qty: 30 TABLET | Refills: 0 | Status: SHIPPED | OUTPATIENT
Start: 2021-06-01 | End: 2021-07-07

## 2021-06-16 ENCOUNTER — RA CDI HCC (OUTPATIENT)
Dept: OTHER | Facility: HOSPITAL | Age: 81
End: 2021-06-16

## 2021-06-16 NOTE — PROGRESS NOTES
[place] [address]  coding opportunities             Chart reviewed, (number of) suggestions sent to provider: 1     Problem listed updated   Provider Accepted, (number of) suggestions accepted: 1               Patients insurance company: [employer]     Visit status: Provider canceled the appointment        [place] [address]  coding opportunities        6/18     Chart reviewed, (number of) suggestions sent to provider: 1    E66 01  Morbid obesity due to excess calories (BMI over 40 0)                  Patients insurance company: [employer]

## 2021-06-17 ENCOUNTER — OFFICE VISIT (OUTPATIENT)
Dept: FAMILY MEDICINE CLINIC | Facility: CLINIC | Age: 81
End: 2021-06-17
Payer: COMMERCIAL

## 2021-06-17 VITALS
WEIGHT: 226.6 LBS | HEIGHT: 62 IN | RESPIRATION RATE: 18 BRPM | HEART RATE: 111 BPM | TEMPERATURE: 97.5 F | BODY MASS INDEX: 41.7 KG/M2 | DIASTOLIC BLOOD PRESSURE: 70 MMHG | SYSTOLIC BLOOD PRESSURE: 120 MMHG | OXYGEN SATURATION: 97 %

## 2021-06-17 DIAGNOSIS — M35.00 SJOGREN'S SYNDROME, WITH UNSPECIFIED ORGAN INVOLVEMENT (HCC): ICD-10-CM

## 2021-06-17 DIAGNOSIS — F41.1 ANXIETY STATE: ICD-10-CM

## 2021-06-17 DIAGNOSIS — E78.2 MIXED HYPERLIPIDEMIA: ICD-10-CM

## 2021-06-17 DIAGNOSIS — Z00.00 HEALTHCARE MAINTENANCE: Primary | ICD-10-CM

## 2021-06-17 DIAGNOSIS — K21.9 GASTROESOPHAGEAL REFLUX DISEASE WITHOUT ESOPHAGITIS: ICD-10-CM

## 2021-06-17 PROCEDURE — 1160F RVW MEDS BY RX/DR IN RCRD: CPT | Performed by: FAMILY MEDICINE

## 2021-06-17 PROCEDURE — 99214 OFFICE O/P EST MOD 30 MIN: CPT | Performed by: FAMILY MEDICINE

## 2021-06-17 PROCEDURE — 1036F TOBACCO NON-USER: CPT | Performed by: FAMILY MEDICINE

## 2021-06-17 NOTE — ASSESSMENT & PLAN NOTE
Educated about importance of diet, exercise and immunizations  Obtain CBC, CMP, TSH, lipid panel and follow up in 6 months

## 2021-06-17 NOTE — PROGRESS NOTES
Assessment/Plan:    Healthcare maintenance  Educated about importance of diet, exercise and immunizations  Obtain CBC, CMP, TSH, lipid panel and follow up in 6 months  Hyperlipidemia  Continue same  Obtain lipid panel and follow up in 6 months  Anxiety state  Controlled  Continue same  Follow up in 6 months  Gastroesophageal reflux disease  Stable  Continue same  Will continue to monitor  Problem List Items Addressed This Visit        Digestive    Gastroesophageal reflux disease     Stable  Continue same  Will continue to monitor  Other    Anxiety state     Controlled  Continue same  Follow up in 6 months  Hyperlipidemia     Continue same  Obtain lipid panel and follow up in 6 months  Relevant Orders    CBC and differential    Comprehensive metabolic panel    Lipid Panel with Direct LDL reflex    TSH, 3rd generation with Free T4 reflex    Sjogren's syndrome ([facility] [address] )    Healthcare maintenance - Primary     Educated about importance of diet, exercise and immunizations  Obtain CBC, CMP, TSH, lipid panel and follow up in 6 months  Relevant Orders    CBC and differential    Comprehensive metabolic panel    Lipid Panel with Direct LDL reflex    TSH, 3rd generation with Free T4 reflex    Body mass index (BMI)40 0-44 9, adult (HCC)            Subjective:      Patient ID: Mariia Finley is a 80 y o  female  Aster Harding is a 80year old female presenting to the office for annual wellness visit and follow up for anxiety  She is taking Paxil 10mg and feel that it is working well  She has no complaints today but is asking how to obtain a COIVD vaccine  The following portions of the patient's history were reviewed and updated as appropriate: allergies, current medications, past family history, past medical history, past social history, past surgical history and problem list     Review of Systems   Constitutional: Negative for chills and fever     Respiratory: Negative for shortness of breath  Cardiovascular: Negative for chest pain  Musculoskeletal: Positive for arthralgias (Knees)  Neurological: Negative for dizziness  Psychiatric/Behavioral: The patient is not nervous/anxious  Objective:      /70 (BP Location: Left arm, Patient Position: Sitting, Cuff Size: Large)   Pulse (!) 111   Temp 97 5 °F (36 4 °C) (Tympanic)   Resp 18   Ht 5' 2" (1 575 m)   Wt 103 kg (226 lb 9 6 oz)   SpO2 97%   BMI 41 45 kg/m²          Physical Exam  Vitals and nursing note reviewed  Constitutional:       General: She is not in acute distress  Appearance: Normal appearance  She is well-developed  She is not toxic-appearing  HENT:      Head: Normocephalic and atraumatic  Eyes:      Pupils: Pupils are equal, round, and reactive to light  Cardiovascular:      Rate and Rhythm: Normal rate and regular rhythm  Heart sounds: Normal heart sounds  Pulmonary:      Effort: Pulmonary effort is normal       Breath sounds: Normal breath sounds  Abdominal:      General: Bowel sounds are normal       Palpations: Abdomen is soft  Musculoskeletal:         General: Normal range of motion  Cervical back: Normal range of motion and neck supple  Lymphadenopathy:      Cervical: No cervical adenopathy  Skin:     General: Skin is warm and dry  Neurological:      General: No focal deficit present  Mental Status: She is alert and oriented to person, place, and time  Cranial Nerves: No cranial nerve deficit  BMI Counseling: Body mass index is 41 45 kg/m²  The BMI is above normal  Nutrition recommendations include decreasing portion sizes, encouraging healthy choices of fruits and vegetables and decreasing fast food intake  Falls Plan of Care: balance, strength, and gait training instructions were provided

## 2021-06-18 PROBLEM — E66.01 MORBID OBESITY (HCC): Status: ACTIVE | Noted: 2021-06-18

## 2021-07-07 ENCOUNTER — TELEPHONE (OUTPATIENT)
Dept: FAMILY MEDICINE CLINIC | Facility: CLINIC | Age: 81
End: 2021-07-07

## 2021-07-07 NOTE — TELEPHONE ENCOUNTER
Message from Radha, daughter, asking if her mother can get xrays of her knees  She fell a couple years ago and patient seems to think something is not right in her knees  She did have knee replacements

## 2021-07-09 ENCOUNTER — OFFICE VISIT (OUTPATIENT)
Dept: FAMILY MEDICINE CLINIC | Facility: CLINIC | Age: 81
End: 2021-07-09
Payer: COMMERCIAL

## 2021-07-09 VITALS
HEART RATE: 100 BPM | WEIGHT: 226 LBS | SYSTOLIC BLOOD PRESSURE: 136 MMHG | HEIGHT: 62 IN | DIASTOLIC BLOOD PRESSURE: 82 MMHG | TEMPERATURE: 98.2 F | OXYGEN SATURATION: 95 % | BODY MASS INDEX: 41.59 KG/M2 | RESPIRATION RATE: 16 BRPM

## 2021-07-09 DIAGNOSIS — G89.29 CHRONIC PAIN OF BOTH KNEES: Primary | ICD-10-CM

## 2021-07-09 DIAGNOSIS — M25.561 CHRONIC PAIN OF BOTH KNEES: Primary | ICD-10-CM

## 2021-07-09 DIAGNOSIS — M25.562 CHRONIC PAIN OF BOTH KNEES: Primary | ICD-10-CM

## 2021-07-09 PROCEDURE — 1160F RVW MEDS BY RX/DR IN RCRD: CPT | Performed by: FAMILY MEDICINE

## 2021-07-09 PROCEDURE — 1036F TOBACCO NON-USER: CPT | Performed by: FAMILY MEDICINE

## 2021-07-09 PROCEDURE — 99213 OFFICE O/P EST LOW 20 MIN: CPT | Performed by: FAMILY MEDICINE

## 2021-07-09 NOTE — PROGRESS NOTES
Assessment/Plan:    Chronic pain of both knees  Referral to orthopedic surgery  Can take Tylenol as needed throughout the day in the meantime  Can continue taking Norco at night as needed  Problem List Items Addressed This Visit        Other    Chronic pain of both knees - Primary     Referral to orthopedic surgery  Can take Tylenol as needed throughout the day in the meantime  Can continue taking Norco at night as needed  Relevant Orders    Ambulatory referral to Orthopedic Surgery            Subjective:      Patient ID: Adam Holt is a 80 y o  female  Patient is a 81 yo female presenting with chronic bilateral knee pain and low back pain  Pt reports she had bilateral knee replacements around 10 years ago  She reports a fall onto her knees ~1 year ago  She notes increasing pain and difficulty getting around since then  The daughter states that she just wants to make sure nothing was injured in her knees  She has a hx of chronic back pain at well  Daughter reports she is very immobile  She takes Norco a couple times per week at bedtime to help with pain  The following portions of the patient's history were reviewed and updated as appropriate: allergies, current medications, past family history, past medical history, past social history, past surgical history and problem list     Review of Systems   Constitutional: Negative for chills and fever  HENT: Negative for trouble swallowing  Eyes: Negative for visual disturbance  Respiratory: Negative for cough and shortness of breath  Cardiovascular: Negative for chest pain, palpitations and leg swelling  Gastrointestinal: Negative for abdominal pain, constipation, diarrhea and vomiting  Endocrine: Negative for cold intolerance and heat intolerance  Genitourinary: Negative for difficulty urinating and dysuria  Musculoskeletal: Positive for arthralgias and back pain  Negative for gait problem and joint swelling     Skin: Negative for color change and rash  Neurological: Negative for dizziness, tremors, seizures, syncope and headaches  Hematological: Negative for adenopathy  Psychiatric/Behavioral: Negative for behavioral problems  All other systems reviewed and are negative  Objective:      /82 (BP Location: Left arm, Patient Position: Sitting, Cuff Size: Large)   Pulse 100   Temp 98 2 °F (36 8 °C) (Tympanic)   Resp 16   Ht 5' 2" (1 575 m)   Wt 103 kg (226 lb)   SpO2 95%   BMI 41 34 kg/m²          Physical Exam  Vitals and nursing note reviewed  HENT:      Head: Normocephalic and atraumatic  Right Ear: External ear normal       Left Ear: External ear normal    Eyes:      Conjunctiva/sclera: Conjunctivae normal       Pupils: Pupils are equal, round, and reactive to light  Cardiovascular:      Rate and Rhythm: Normal rate and regular rhythm  Heart sounds: Murmur heard  Pulmonary:      Effort: Pulmonary effort is normal       Breath sounds: Normal breath sounds  Musculoskeletal:         General: No swelling  Right lower leg: No edema  Left lower leg: No edema  Comments: Tenderness to medial aspect of left knee  No tenderness to right knee  Able to extend and flex the knees  3/5 quad strength bilaterally    Skin:     General: Skin is warm and dry  Neurological:      General: No focal deficit present  Mental Status: She is alert  Psychiatric:         Mood and Affect: Mood normal          Thought Content:  Thought content normal

## 2021-07-09 NOTE — ASSESSMENT & PLAN NOTE
Referral to orthopedic surgery  Can take Tylenol as needed throughout the day in the meantime  Can continue taking Norco at night as needed

## 2021-08-05 DIAGNOSIS — F41.9 ANXIETY: ICD-10-CM

## 2021-08-09 RX ORDER — ALPRAZOLAM 0.25 MG/1
TABLET ORAL
Qty: 120 TABLET | Refills: 0 | Status: SHIPPED | OUTPATIENT
Start: 2021-08-09 | End: 2022-06-21 | Stop reason: SDUPTHER

## 2021-08-09 NOTE — TELEPHONE ENCOUNTER
Kalyani Juarez   Age: 80  Data as of: 08/09/2021    Demographics    Linked Records                Search Criteria            Summary    Summary  Total Prescriptions:    17    Total Prescribers:    3    Total Pharmacies:    1    Narcotics* (excluding Buprenorphine)  Current Qty:   0   Current MME/day:   0 00   30 Day Avg MME/day:   6 67   Buprenorphine*  Current Qty:   0   Current mg/day:   0 00   30 Day Avg mg/day:   0 00   Prescriptions    *Highlighted drugs could not be included in score calculations   Prescriptions  Total Prescriptions: 17    Total Private Pay: 0    Fill Date ID   Written Drug Qty Days Prescriber Rx # Pharmacy Refill   Daily Dose* Pymt Type      06/30/2021  1   06/30/2021  Hydrocodone-Acetamin 5-325 MG  60 00  30 Ma Bie   [id number]   Fay ([id number])   0  10 00 MME  [place]   05/03/2021  1   05/03/2021  Hydrocodone-Acetamin 5-325 MG  60 00  30 Ma Bie   [id number]   Fay ([id number])   0  10 00 MME  [place]   03/08/2021  1   03/08/2021  Hydrocodone-Acetamin 5-325 MG  60 00  30 Ma Bie   [id number]   Fay ([id number])   0  10 00 MME  [place]   01/05/2021  1   01/04/2021  Alprazolam 0 25 MG Tablet  120 00  60 Wa Abo   [id number]   Fay ([id number])   0   [place]are [place]   01/04/2021  1   01/04/2021  Hydrocodone-Acetamin 5-325 MG  60 00  30 Ma Bie   [id number]   Fay ([id number])   0  10 00 MME  [place]are [place]   12/02/2020  1   12/01/2020  Hydrocodone-Acetamin 5-325 MG  60 00  30 Ma Bie   [id number]   Fay ([id number])   0  10 00 MME  [place]   10/06/2020  1   10/06/2020  Hydrocodone-Acetamin 5-325 MG  60 00  30 Ma Bie   [id number]   Fay ([id number])   0  10 00 MME  [place]are   [place]   08/24/2020  1   08/24/2020  Hydrocodone-Acetamin 5-325 MG  60 00  30 Ma Bie   [id number]   Fay ([id number])   0  10 00 MME  [place]   07/22/2020  1   07/22/2020  Hydrocodone-Acetamin 5-325 MG  60 00  30 Ma Bie   [id number]   Fay ([id number])   0  10 00 MME  [place]are   [place]   06/22/2020  1   06/22/2020  Hydrocodone-Acetamin 5-325 MG  60 00  30 Ma Bie   [id number]   Fay ([id number])   0 10 00 MME  [place]are   [place]   05/19/2020  1   05/19/2020  Hydrocodone-Acetamin 5-325 MG  60 00  30 Ma Bie   [id number]   Fay (0141)   0  10 00 MME  [place]are   [place]   05/12/2020  1   05/11/2020  Alprazolam 0 25 MG Tablet  120 00  60 Wa Abo   [id number]   Fay ([id number])   0   Medicare   [place]   04/17/2020  1   04/17/2020  Hydrocodone-Acetamin 5-325 MG  60 00  30 Ma Bie   [id number]   Fay ([id number])   0  10 00 MME  [place]are   [place]   03/12/2020  1   03/12/2020  Hydrocodone-Acetamin 5-325 MG  60 00  30 Ma Bie   [id number]   Fay ([id number])   0  10 00 MME  Med[place]are   [place]   02/11/2020  1   02/11/2020  Hydrocodone-Acetamin 5-325 MG  60 00  30 Ma Bie   [id number]   Fay ([id number])   0  10 00 MME  [place]are   [place]   01/06/2020  1   01/06/2020  Hydrocodone-Acetamin 5-325 MG  60 00  30 My See   [id number]   Fay ([id number])   0  10 00 MME  [place]are   [place]   10/21/2019  1   10/21/2019  Hydrocodone-Acetamin 5-325 MG  60 00  30 Ma Bie   [id number]   Fay ([id number])   0  10 00 MME  Medicare   [place]   *Per CDC guidance, the MME conversion factors prescribed or provided as part of the medication-assisted treatment for opioid use disorder should not be used to benchmark against dosage thresholds meant for opioids prescribed for pain  Buprenorphine products have no agreed upon morphine equivalency, and as partial opioid agonists, are not expected to be associated with overdose risk in the same dose-dependent manner as doses for full agonist opioids  MME = morphine milligram equivalents  LME = Lorazepam milligram equivalents  MG = dose in milligrams     Providers  Total Providers: 3   Name [place] Phone   Laila Sarah MD [address] [facility], [address], Samantha DAMON [address]   [place] [address]  Total Pharmacies: 1   Name [place] Phone   [id number] [id number]    [address] [address]

## 2021-08-10 ENCOUNTER — OFFICE VISIT (OUTPATIENT)
Dept: FAMILY MEDICINE CLINIC | Facility: CLINIC | Age: 81
End: 2021-08-10
Payer: COMMERCIAL

## 2021-08-10 VITALS
SYSTOLIC BLOOD PRESSURE: 132 MMHG | OXYGEN SATURATION: 94 % | WEIGHT: 223 LBS | HEART RATE: 81 BPM | DIASTOLIC BLOOD PRESSURE: 76 MMHG | RESPIRATION RATE: 16 BRPM | TEMPERATURE: 98.4 F | BODY MASS INDEX: 41.04 KG/M2 | HEIGHT: 62 IN

## 2021-08-10 DIAGNOSIS — S49.91XD INJURY OF RIGHT SHOULDER, SUBSEQUENT ENCOUNTER: Primary | ICD-10-CM

## 2021-08-10 PROBLEM — S49.91XA INJURY OF RIGHT SHOULDER: Status: ACTIVE | Noted: 2021-08-10

## 2021-08-10 PROCEDURE — 1036F TOBACCO NON-USER: CPT | Performed by: FAMILY MEDICINE

## 2021-08-10 PROCEDURE — 99214 OFFICE O/P EST MOD 30 MIN: CPT | Performed by: FAMILY MEDICINE

## 2021-08-10 PROCEDURE — 1160F RVW MEDS BY RX/DR IN RCRD: CPT | Performed by: FAMILY MEDICINE

## 2021-08-10 NOTE — ASSESSMENT & PLAN NOTE
I am going to check MRI of her left shoulder  Take Tylenol for pain  I will consider referral to see Ortho

## 2021-08-10 NOTE — PROGRESS NOTES
Assessment/Plan:  Injury of right shoulder    I am going to check MRI of her left shoulder  Take Tylenol for pain  I will consider referral to see Ortho  Diagnoses and all orders for this visit:    Injury of right shoulder, subsequent encounter  -     MRI shoulder right wo contrast; Future          There are no Patient Instructions on file for this visit  Return if symptoms worsen or fail to improve  Subjective:      Patient ID: Zoraida Bashir is a 80 y o  female  Chief Complaint   Patient presents with    Shoulder Pain        She is here today with complaint of right shoulder pain status post injury  She fell off her bed  She was seen by her rheumatologist ordered x-ray did not show any fracture  Patient continue with her pain  Has been losing range of motion  Been able to move her arm above her head  The following portions of the patient's history were reviewed and updated as appropriate: allergies, current medications, past family history, past medical history, past social history, past surgical history and problem list     Review of Systems   Constitutional: Negative for chills and fever  HENT: Negative for trouble swallowing  Eyes: Negative for visual disturbance  Respiratory: Negative for cough and shortness of breath  Cardiovascular: Negative for chest pain, palpitations and leg swelling  Gastrointestinal: Negative for abdominal pain, constipation and diarrhea  Endocrine: Negative for cold intolerance and heat intolerance  Genitourinary: Negative for difficulty urinating and dysuria  Musculoskeletal: Negative for gait problem  Right shoulder pain   Skin: Negative for rash  Neurological: Negative for dizziness, tremors, seizures and headaches  Hematological: Negative for adenopathy  Psychiatric/Behavioral: Negative for behavioral problems           Current Outpatient Medications   Medication Sig Dispense Refill    ALPRAZolam (XANAX) 0 25 mg tablet TAKE ONE TABLET BY MOUTH TWICE A  tablet 0    Cyanocobalamin (VITAMIN B-12 PO) Take by mouth daily      HYDROcodone-acetaminophen (NORCO) 7 5-325 mg per tablet every 6 (six) hours      PARoxetine (PAXIL) 10 mg tablet TAKE TWO TABLETS BY MOUTH EVERY DAY 60 tablet 3    rosuvastatin (CRESTOR) 10 MG tablet TAKE ONE TABLET BY MOUTH EVERY DAY 30 tablet 3    VITAMIN D PO Take 2,000 Int'l Units by mouth daily      Cholecalciferol (VITAMIN D3) 5000 units CAPS Take by mouth (Patient not taking: Reported on 6/17/2021)      esomeprazole (NEXIUM) 40 MG capsule every 24 hours (Patient not taking: Reported on 6/17/2021)       No current facility-administered medications for this visit  Objective:    /76 (BP Location: Left arm, Patient Position: Sitting, Cuff Size: Large)   Pulse 81   Temp 98 4 °F (36 9 °C) (Tympanic)   Resp 16   Ht 5' 2" (1 575 m)   Wt 101 kg (223 lb)   SpO2 94%   BMI 40 79 kg/m²        Physical Exam  Vitals and nursing note reviewed  Constitutional:       Appearance: She is well-developed  HENT:      Head: Normocephalic and atraumatic  Eyes:      Pupils: Pupils are equal, round, and reactive to light  Cardiovascular:      Rate and Rhythm: Normal rate and regular rhythm  Heart sounds: Normal heart sounds  Pulmonary:      Effort: Pulmonary effort is normal       Breath sounds: Normal breath sounds  Abdominal:      General: Bowel sounds are normal       Palpations: Abdomen is soft  Musculoskeletal:         General: Tenderness (Tenderness to palpation over the anterior and lateral aspects of right shoulders with very limited range of motion due to pain and stiffness ) present  Normal range of motion  Cervical back: Normal range of motion and neck supple  Lymphadenopathy:      Cervical: No cervical adenopathy  Skin:     General: Skin is warm  Neurological:      Mental Status: She is alert and oriented to person, place, and time        Cranial Nerves: No cranial nerve deficit                  Belinda López MD

## 2021-08-16 ENCOUNTER — HOSPITAL ENCOUNTER (OUTPATIENT)
Dept: RADIOLOGY | Facility: IMAGING CENTER | Age: 81
Discharge: HOME | End: 2021-08-16
Payer: COMMERCIAL

## 2021-08-16 ENCOUNTER — OFFICE VISIT (OUTPATIENT)
Dept: FAMILY MEDICINE CLINIC | Facility: CLINIC | Age: 81
End: 2021-08-16
Payer: COMMERCIAL

## 2021-08-16 ENCOUNTER — APPOINTMENT (OUTPATIENT)
Dept: LAB | Facility: IMAGING CENTER | Age: 81
End: 2021-08-16
Payer: COMMERCIAL

## 2021-08-16 ENCOUNTER — TELEPHONE (OUTPATIENT)
Dept: FAMILY MEDICINE CLINIC | Facility: CLINIC | Age: 81
End: 2021-08-16

## 2021-08-16 VITALS
RESPIRATION RATE: 16 BRPM | WEIGHT: 226 LBS | SYSTOLIC BLOOD PRESSURE: 130 MMHG | DIASTOLIC BLOOD PRESSURE: 82 MMHG | BODY MASS INDEX: 41.59 KG/M2 | HEIGHT: 62 IN | OXYGEN SATURATION: 95 % | HEART RATE: 88 BPM | TEMPERATURE: 97.8 F

## 2021-08-16 DIAGNOSIS — M54.9 CHRONIC BILATERAL BACK PAIN, UNSPECIFIED BACK LOCATION: ICD-10-CM

## 2021-08-16 DIAGNOSIS — R10.9 FLANK PAIN: Primary | ICD-10-CM

## 2021-08-16 DIAGNOSIS — R10.9 FLANK PAIN: ICD-10-CM

## 2021-08-16 DIAGNOSIS — G89.29 CHRONIC BILATERAL BACK PAIN, UNSPECIFIED BACK LOCATION: ICD-10-CM

## 2021-08-16 LAB
BACTERIA UR QL AUTO: ABNORMAL /HPF
BILIRUB UR QL STRIP: ABNORMAL
CAOX CRY URNS QL MICRO: ABNORMAL /HPF
CLARITY UR: ABNORMAL
COLOR UR: ABNORMAL
GLUCOSE UR STRIP-MCNC: NEGATIVE MG/DL
HGB UR QL STRIP.AUTO: NEGATIVE
KETONES UR STRIP-MCNC: ABNORMAL MG/DL
LEUKOCYTE ESTERASE UR QL STRIP: ABNORMAL
MUCOUS THREADS UR QL AUTO: ABNORMAL
NITRITE UR QL STRIP: NEGATIVE
NON-SQ EPI CELLS URNS QL MICRO: ABNORMAL /HPF
PH UR STRIP.AUTO: 5.5 [PH]
PROT UR STRIP-MCNC: ABNORMAL MG/DL
RBC #/AREA URNS AUTO: ABNORMAL /HPF
SP GR UR STRIP.AUTO: 1.03 (ref 1–1.03)
UROBILINOGEN UR QL STRIP.AUTO: 0.2 E.U./DL
WBC #/AREA URNS AUTO: ABNORMAL /HPF

## 2021-08-16 PROCEDURE — 74018 RADEX ABDOMEN 1 VIEW: CPT

## 2021-08-16 PROCEDURE — 1160F RVW MEDS BY RX/DR IN RCRD: CPT | Performed by: FAMILY MEDICINE

## 2021-08-16 PROCEDURE — 72110 X-RAY EXAM L-2 SPINE 4/>VWS: CPT

## 2021-08-16 PROCEDURE — 81001 URINALYSIS AUTO W/SCOPE: CPT | Performed by: FAMILY MEDICINE

## 2021-08-16 PROCEDURE — 1036F TOBACCO NON-USER: CPT | Performed by: FAMILY MEDICINE

## 2021-08-16 PROCEDURE — 99214 OFFICE O/P EST MOD 30 MIN: CPT | Performed by: FAMILY MEDICINE

## 2021-08-16 PROCEDURE — 72072 X-RAY EXAM THORAC SPINE 3VWS: CPT

## 2021-08-16 RX ORDER — LORATADINE 10 MG/1
10 TABLET ORAL DAILY
COMMUNITY

## 2021-08-16 NOTE — TELEPHONE ENCOUNTER
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

## 2021-08-16 NOTE — PROGRESS NOTES
Assessment/Plan:  Flank pain    I am going to check KUB  Discussed with patient about increase oral hydration  I will check UA and C&S  Chronic bilateral back pain    I am going to check x-rays of her lumbar and thoracic spine  Take Tylenol for pain  Diagnoses and all orders for this visit:    Flank pain  -     XR abdomen 1 view kub; Future    Chronic bilateral back pain, unspecified back location  -     UA w Reflex to Microscopic w Reflex to Culture  -     XR spine lumbar minimum 4 views non injury; Future  -     XR spine thoracic 3 vw; Future    Other orders  -     Cancel: POCT urine dip  -     loratadine (CLARITIN) 10 mg tablet; Take 10 mg by mouth daily          There are no Patient Instructions on file for this visit  Return if symptoms worsen or fail to improve  Subjective:      Patient ID: Kristel Valdes is a 80 y o  female  Chief Complaint   Patient presents with    Back Pain        She is here today with complaint of severe back pain started last night  She took Vicodin and that helped a little bit with her pain  She denies any urinary symptoms  She has history of kidney stones and she stated it feels the same  She has recent history of fall but she did not complained of back pain at that time  She denies any radiation of the pain to the lower extremities  The following portions of the patient's history were reviewed and updated as appropriate: allergies, current medications, past family history, past medical history, past social history, past surgical history and problem list     Review of Systems   Constitutional: Negative for chills and fever  HENT: Negative for trouble swallowing  Eyes: Negative for visual disturbance  Respiratory: Negative for cough and shortness of breath  Cardiovascular: Negative for chest pain, palpitations and leg swelling  Gastrointestinal: Negative for abdominal pain, constipation and diarrhea     Endocrine: Negative for cold intolerance and heat intolerance  Genitourinary: Negative for difficulty urinating and dysuria  Musculoskeletal: Positive for back pain  Negative for gait problem  Skin: Negative for rash  Neurological: Negative for dizziness, tremors, seizures and headaches  Hematological: Negative for adenopathy  Psychiatric/Behavioral: Negative for behavioral problems  Current Outpatient Medications   Medication Sig Dispense Refill    ALPRAZolam (XANAX) 0 25 mg tablet TAKE ONE TABLET BY MOUTH TWICE A  tablet 0    Cholecalciferol (VITAMIN D3) 5000 units CAPS Take by mouth       HYDROcodone-acetaminophen (NORCO) 7 5-325 mg per tablet every 6 (six) hours      loratadine (CLARITIN) 10 mg tablet Take 10 mg by mouth daily      PARoxetine (PAXIL) 10 mg tablet TAKE TWO TABLETS BY MOUTH EVERY DAY 60 tablet 3    rosuvastatin (CRESTOR) 10 MG tablet TAKE ONE TABLET BY MOUTH EVERY DAY 30 tablet 3    VITAMIN D PO Take 2,000 Int'l Units by mouth daily      Cyanocobalamin (VITAMIN B-12 PO) Take by mouth daily (Patient not taking: Reported on 8/16/2021)      esomeprazole (NEXIUM) 40 MG capsule every 24 hours (Patient not taking: Reported on 6/17/2021)       No current facility-administered medications for this visit  Objective:    /82 (BP Location: Left arm, Patient Position: Sitting, Cuff Size: Large)   Pulse 88   Temp 97 8 °F (36 6 °C) (Tympanic)   Resp 16   Ht 5' 2" (1 575 m)   Wt 103 kg (226 lb)   SpO2 95%   BMI 41 34 kg/m²        Physical Exam  Vitals and nursing note reviewed  Constitutional:       Appearance: She is well-developed  HENT:      Head: Normocephalic and atraumatic  Eyes:      Pupils: Pupils are equal, round, and reactive to light  Cardiovascular:      Rate and Rhythm: Normal rate and regular rhythm  Heart sounds: Normal heart sounds  Pulmonary:      Effort: Pulmonary effort is normal       Breath sounds: Normal breath sounds     Abdominal:      General: Bowel sounds are normal       Palpations: Abdomen is soft  Musculoskeletal:         General: Tenderness (  Tenderness to palpation over lumbar and thoracic spine and the paraspinal area  limited range of motion due to pain ) present  Cervical back: Normal range of motion and neck supple  Right lower leg: No edema  Left lower leg: No edema  Lymphadenopathy:      Cervical: No cervical adenopathy  Skin:     General: Skin is warm  Neurological:      Mental Status: She is alert and oriented to person, place, and time  Cranial Nerves: No cranial nerve deficit                  Kristofer Chin MD

## 2021-08-16 NOTE — ASSESSMENT & PLAN NOTE
I am going to check KUB  Discussed with patient about increase oral hydration  I will check UA and C&S

## 2021-08-17 ENCOUNTER — TELEPHONE (OUTPATIENT)
Dept: FAMILY MEDICINE CLINIC | Facility: CLINIC | Age: 81
End: 2021-08-17

## 2021-08-17 DIAGNOSIS — E78.2 MIXED HYPERLIPIDEMIA: ICD-10-CM

## 2021-08-17 RX ORDER — ROSUVASTATIN CALCIUM 10 MG/1
10 TABLET, COATED ORAL DAILY
Qty: 90 TABLET | Refills: 1 | Status: SHIPPED | OUTPATIENT
Start: 2021-08-17 | End: 2022-03-03 | Stop reason: SDUPTHER

## 2021-08-17 NOTE — TELEPHONE ENCOUNTER
Message from [employer] in [address] asking for #90 per insurance coverage of Rosuvastatin    Call [phone]

## 2021-08-23 ENCOUNTER — APPOINTMENT (OUTPATIENT)
Dept: LAB | Facility: IMAGING CENTER | Age: 81
End: 2021-08-23
Payer: COMMERCIAL

## 2021-08-23 ENCOUNTER — HOSPITAL ENCOUNTER (OUTPATIENT)
Dept: RADIOLOGY | Facility: IMAGING CENTER | Age: 81
Discharge: HOME | End: 2021-08-23
Payer: COMMERCIAL

## 2021-08-23 ENCOUNTER — TELEPHONE (OUTPATIENT)
Dept: FAMILY MEDICINE CLINIC | Facility: CLINIC | Age: 81
End: 2021-08-23

## 2021-08-23 DIAGNOSIS — S49.91XD INJURY OF RIGHT SHOULDER, SUBSEQUENT ENCOUNTER: ICD-10-CM

## 2021-08-23 DIAGNOSIS — E78.2 MIXED HYPERLIPIDEMIA: ICD-10-CM

## 2021-08-23 DIAGNOSIS — Z00.00 HEALTHCARE MAINTENANCE: ICD-10-CM

## 2021-08-23 LAB
ALBUMIN SERPL BCP-MCNC: 3.8 G/DL (ref 3.5–5)
ALP SERPL-CCNC: 136 U/L (ref 46–116)
ALT SERPL W P-5'-P-CCNC: 22 U/L (ref 12–78)
ANION GAP SERPL CALCULATED.3IONS-SCNC: 6 MMOL/L (ref 4–13)
AST SERPL W P-5'-P-CCNC: 17 U/L (ref 5–45)
BASOPHILS # BLD AUTO: 0.07 THOUSANDS/ΜL (ref 0–0.1)
BASOPHILS NFR BLD AUTO: 1 % (ref 0–1)
BILIRUB SERPL-MCNC: 0.43 MG/DL (ref 0.2–1)
BUN SERPL-MCNC: 12 MG/DL (ref 5–25)
CALCIUM SERPL-MCNC: 10.3 MG/DL (ref 8.3–10.1)
CHLORIDE SERPL-SCNC: 106 MMOL/L (ref 100–108)
CHOLEST SERPL-MCNC: 197 MG/DL (ref 50–200)
CO2 SERPL-SCNC: 25 MMOL/L (ref 21–32)
CREAT SERPL-MCNC: 1.08 MG/DL (ref 0.6–1.3)
EOSINOPHIL # BLD AUTO: 0.16 THOUSAND/ΜL (ref 0–0.61)
EOSINOPHIL NFR BLD AUTO: 2 % (ref 0–6)
ERYTHROCYTE [DISTWIDTH] IN BLOOD BY AUTOMATED COUNT: 14.6 % (ref 11.6–15.1)
GFR SERPL CREATININE-BSD FRML MDRD: 49 ML/MIN/1.73SQ M
GLUCOSE P FAST SERPL-MCNC: 124 MG/DL (ref 65–99)
HCT VFR BLD AUTO: 48.9 % (ref 34.8–46.1)
HDLC SERPL-MCNC: 64 MG/DL
HGB BLD-MCNC: 14.7 G/DL (ref 11.5–15.4)
IMM GRANULOCYTES # BLD AUTO: 0.02 THOUSAND/UL (ref 0–0.2)
IMM GRANULOCYTES NFR BLD AUTO: 0 % (ref 0–2)
LDLC SERPL CALC-MCNC: 107 MG/DL (ref 0–100)
LYMPHOCYTES # BLD AUTO: 2.22 THOUSANDS/ΜL (ref 0.6–4.47)
LYMPHOCYTES NFR BLD AUTO: 30 % (ref 14–44)
MCH RBC QN AUTO: 27 PG (ref 26.8–34.3)
MCHC RBC AUTO-ENTMCNC: 30.1 G/DL (ref 31.4–37.4)
MCV RBC AUTO: 90 FL (ref 82–98)
MONOCYTES # BLD AUTO: 0.61 THOUSAND/ΜL (ref 0.17–1.22)
MONOCYTES NFR BLD AUTO: 8 % (ref 4–12)
NEUTROPHILS # BLD AUTO: 4.37 THOUSANDS/ΜL (ref 1.85–7.62)
NEUTS SEG NFR BLD AUTO: 59 % (ref 43–75)
NRBC BLD AUTO-RTO: 0 /100 WBCS
PLATELET # BLD AUTO: 317 THOUSANDS/UL (ref 149–390)
PMV BLD AUTO: 10.9 FL (ref 8.9–12.7)
POTASSIUM SERPL-SCNC: 5 MMOL/L (ref 3.5–5.3)
PROT SERPL-MCNC: 8.2 G/DL (ref 6.4–8.2)
RBC # BLD AUTO: 5.45 MILLION/UL (ref 3.81–5.12)
SODIUM SERPL-SCNC: 137 MMOL/L (ref 136–145)
TRIGL SERPL-MCNC: 130 MG/DL
TSH SERPL DL<=0.05 MIU/L-ACNC: 2.79 UIU/ML (ref 0.36–3.74)
WBC # BLD AUTO: 7.45 THOUSAND/UL (ref 4.31–10.16)

## 2021-08-23 PROCEDURE — 80053 COMPREHEN METABOLIC PANEL: CPT

## 2021-08-23 PROCEDURE — 36415 COLL VENOUS BLD VENIPUNCTURE: CPT

## 2021-08-23 PROCEDURE — 84443 ASSAY THYROID STIM HORMONE: CPT

## 2021-08-23 PROCEDURE — 73221 MRI JOINT UPR EXTREM W/O DYE: CPT

## 2021-08-23 PROCEDURE — 80061 LIPID PANEL: CPT

## 2021-08-23 PROCEDURE — 85025 COMPLETE CBC W/AUTO DIFF WBC: CPT

## 2021-08-23 PROCEDURE — G1004 CDSM NDSC: HCPCS

## 2021-08-23 NOTE — TELEPHONE ENCOUNTER
Message from patient's daughter who said she was scheduled for MRI today    Asking for results of her labs/urine studies and if there are any concerns

## 2021-08-24 ENCOUNTER — TELEPHONE (OUTPATIENT)
Dept: FAMILY MEDICINE CLINIC | Facility: CLINIC | Age: 81
End: 2021-08-24

## 2021-08-24 DIAGNOSIS — R94.4 DECREASED GFR: Primary | ICD-10-CM

## 2021-08-24 DIAGNOSIS — R73.9 HYPERGLYCEMIA: ICD-10-CM

## 2021-08-24 NOTE — TELEPHONE ENCOUNTER
Message from Amrit Ochoa who saw the results of patient's MRI that showed tears      Also looked at the labs and "feels they are not correct because patient could have eaten something"    Will wait for instructions

## 2021-08-25 NOTE — TELEPHONE ENCOUNTER
Carlito Jaramillo aware of MRI results and lab results  She will call pt's Ortho to sched appt  She will encourage oral hydration and recheck fasting labs in 1 week   Order placed

## 2021-09-15 DIAGNOSIS — F32.89 OTHER DEPRESSION: ICD-10-CM

## 2021-09-16 RX ORDER — PAROXETINE 10 MG/1
TABLET, FILM COATED ORAL
Qty: 60 TABLET | Refills: 3 | Status: SHIPPED | OUTPATIENT
Start: 2021-09-16 | End: 2022-02-28 | Stop reason: SDUPTHER

## 2021-10-12 ENCOUNTER — APPOINTMENT (OUTPATIENT)
Dept: LAB | Facility: IMAGING CENTER | Age: 81
End: 2021-10-12
Payer: COMMERCIAL

## 2021-10-12 DIAGNOSIS — R94.4 DECREASED GFR: ICD-10-CM

## 2021-10-12 DIAGNOSIS — R73.9 HYPERGLYCEMIA: ICD-10-CM

## 2021-10-12 LAB
ALBUMIN SERPL BCP-MCNC: 3.4 G/DL (ref 3.5–5)
ALP SERPL-CCNC: 124 U/L (ref 46–116)
ALT SERPL W P-5'-P-CCNC: 19 U/L (ref 12–78)
ANION GAP SERPL CALCULATED.3IONS-SCNC: 6 MMOL/L (ref 4–13)
AST SERPL W P-5'-P-CCNC: 16 U/L (ref 5–45)
BILIRUB SERPL-MCNC: 0.5 MG/DL (ref 0.2–1)
BUN SERPL-MCNC: 12 MG/DL (ref 5–25)
CALCIUM ALBUM COR SERPL-MCNC: 10.5 MG/DL (ref 8.3–10.1)
CALCIUM SERPL-MCNC: 10 MG/DL (ref 8.3–10.1)
CHLORIDE SERPL-SCNC: 105 MMOL/L (ref 100–108)
CO2 SERPL-SCNC: 27 MMOL/L (ref 21–32)
CREAT SERPL-MCNC: 0.94 MG/DL (ref 0.6–1.3)
EST. AVERAGE GLUCOSE BLD GHB EST-MCNC: 120 MG/DL
GFR SERPL CREATININE-BSD FRML MDRD: 58 ML/MIN/1.73SQ M
GLUCOSE P FAST SERPL-MCNC: 93 MG/DL (ref 65–99)
HBA1C MFR BLD: 5.8 %
POTASSIUM SERPL-SCNC: 4.4 MMOL/L (ref 3.5–5.3)
PROT SERPL-MCNC: 7.7 G/DL (ref 6.4–8.2)
SODIUM SERPL-SCNC: 138 MMOL/L (ref 136–145)

## 2021-10-12 PROCEDURE — 83036 HEMOGLOBIN GLYCOSYLATED A1C: CPT

## 2021-10-12 PROCEDURE — 80053 COMPREHEN METABOLIC PANEL: CPT

## 2021-10-12 PROCEDURE — 36415 COLL VENOUS BLD VENIPUNCTURE: CPT

## 2021-10-29 ENCOUNTER — TELEPHONE (OUTPATIENT)
Dept: FAMILY MEDICINE CLINIC | Facility: CLINIC | Age: 81
End: 2021-10-29

## 2021-12-14 ENCOUNTER — TELEPHONE (OUTPATIENT)
Dept: FAMILY MEDICINE CLINIC | Facility: CLINIC | Age: 81
End: 2021-12-14

## 2021-12-17 ENCOUNTER — OFFICE VISIT (OUTPATIENT)
Dept: FAMILY MEDICINE CLINIC | Facility: CLINIC | Age: 81
End: 2021-12-17
Payer: COMMERCIAL

## 2021-12-17 VITALS
HEART RATE: 93 BPM | SYSTOLIC BLOOD PRESSURE: 132 MMHG | BODY MASS INDEX: 39.38 KG/M2 | HEIGHT: 62 IN | OXYGEN SATURATION: 97 % | RESPIRATION RATE: 16 BRPM | DIASTOLIC BLOOD PRESSURE: 80 MMHG | TEMPERATURE: 98.6 F | WEIGHT: 214 LBS

## 2021-12-17 DIAGNOSIS — E78.2 MIXED HYPERLIPIDEMIA: Primary | ICD-10-CM

## 2021-12-17 DIAGNOSIS — E66.01 MORBID OBESITY (HCC): ICD-10-CM

## 2021-12-17 DIAGNOSIS — F32.89 OTHER DEPRESSION: ICD-10-CM

## 2021-12-17 DIAGNOSIS — N18.31 STAGE 3A CHRONIC KIDNEY DISEASE (HCC): ICD-10-CM

## 2021-12-17 DIAGNOSIS — R94.4 DECREASED GFR: ICD-10-CM

## 2021-12-17 DIAGNOSIS — R73.9 HYPERGLYCEMIA: ICD-10-CM

## 2021-12-17 PROCEDURE — 1100F PTFALLS ASSESS-DOCD GE2>/YR: CPT | Performed by: FAMILY MEDICINE

## 2021-12-17 PROCEDURE — 3288F FALL RISK ASSESSMENT DOCD: CPT | Performed by: FAMILY MEDICINE

## 2021-12-17 PROCEDURE — 1160F RVW MEDS BY RX/DR IN RCRD: CPT | Performed by: FAMILY MEDICINE

## 2021-12-17 PROCEDURE — 1036F TOBACCO NON-USER: CPT | Performed by: FAMILY MEDICINE

## 2021-12-17 PROCEDURE — 99214 OFFICE O/P EST MOD 30 MIN: CPT | Performed by: FAMILY MEDICINE

## 2022-02-28 DIAGNOSIS — F32.89 OTHER DEPRESSION: ICD-10-CM

## 2022-02-28 RX ORDER — PAROXETINE 10 MG/1
20 TABLET, FILM COATED ORAL DAILY
Qty: 180 TABLET | Refills: 1 | Status: SHIPPED | OUTPATIENT
Start: 2022-02-28 | End: 2022-06-27

## 2022-02-28 NOTE — TELEPHONE ENCOUNTER
Fax from [facility]  for refill of paroxetine (need to change to 90 day)  and rosuvastatin

## 2022-03-03 DIAGNOSIS — E78.2 MIXED HYPERLIPIDEMIA: ICD-10-CM

## 2022-03-03 RX ORDER — ROSUVASTATIN CALCIUM 10 MG/1
10 TABLET, COATED ORAL DAILY
Qty: 90 TABLET | Refills: 1 | Status: SHIPPED | OUTPATIENT
Start: 2022-03-03

## 2022-04-29 ENCOUNTER — OFFICE VISIT (OUTPATIENT)
Dept: FAMILY MEDICINE CLINIC | Facility: CLINIC | Age: 82
End: 2022-04-29
Payer: COMMERCIAL

## 2022-04-29 DIAGNOSIS — E78.2 MIXED HYPERLIPIDEMIA: ICD-10-CM

## 2022-04-29 DIAGNOSIS — G89.4 CHRONIC PAIN DISORDER: ICD-10-CM

## 2022-04-29 DIAGNOSIS — M54.9 OTHER CHRONIC BACK PAIN: Primary | ICD-10-CM

## 2022-04-29 DIAGNOSIS — N18.31 STAGE 3A CHRONIC KIDNEY DISEASE (HCC): ICD-10-CM

## 2022-04-29 DIAGNOSIS — K21.9 GASTROESOPHAGEAL REFLUX DISEASE WITHOUT ESOPHAGITIS: ICD-10-CM

## 2022-04-29 DIAGNOSIS — Z00.00 HEALTHCARE MAINTENANCE: ICD-10-CM

## 2022-04-29 DIAGNOSIS — G89.29 OTHER CHRONIC BACK PAIN: Primary | ICD-10-CM

## 2022-04-29 DIAGNOSIS — R73.9 HYPERGLYCEMIA: ICD-10-CM

## 2022-04-29 DIAGNOSIS — M35.00 SJOGREN'S SYNDROME, WITH UNSPECIFIED ORGAN INVOLVEMENT (HCC): ICD-10-CM

## 2022-04-29 DIAGNOSIS — E66.01 MORBID OBESITY (HCC): ICD-10-CM

## 2022-04-29 PROCEDURE — G0439 PPPS, SUBSEQ VISIT: HCPCS | Performed by: FAMILY MEDICINE

## 2022-04-29 PROCEDURE — 99214 OFFICE O/P EST MOD 30 MIN: CPT | Performed by: FAMILY MEDICINE

## 2022-04-29 RX ORDER — GABAPENTIN 100 MG/1
100 CAPSULE ORAL 3 TIMES DAILY
Qty: 90 CAPSULE | Refills: 3 | Status: SHIPPED | OUTPATIENT
Start: 2022-04-29

## 2022-04-29 NOTE — PROGRESS NOTES
Assessment and Plan:     Problem List Items Addressed This Visit        Digestive    Gastroesophageal reflux disease     Stable without meds  Will continue to monitor  Genitourinary    Stage 3a chronic kidney disease [facility])     Lab Results   Component Value Date    EGFR 58 10/12/2021    EGFR 49 08/23/2021    EGFR 68 12/20/2019    CREATININE 0 94 10/12/2021    CREATININE 1 08 08/23/2021    CREATININE 0 83 12/20/2019   Stable  Continue to encourage oral hydration  She was told to try to get her blood work done  Other    Chronic pain disorder     She was given prescription for Neurontin 100 mg 3 times a day  Discussed about possible side effects  Was discussed with patient and her daughter that we can increase the dose to 300 mg 3 times a day if needed  Hyperlipidemia     Continue on Crestor  Discussed about low-fat diet  Was told to get her blood work done  Sjogren's syndrome [facility])    Healthcare maintenance     It was discussed about immunizations, diet, exercise and safety measures  Morbid obesity ([address] )    Hyperglycemia     Continue to follow low carb diet  Will continue to monitor her A1c  Notalgia - Primary    Relevant Medications    gabapentin (Neurontin) 100 mg capsule        BMI Counseling: There is no height or weight on file to calculate BMI  The BMI is above normal  Nutrition recommendations include decreasing portion sizes, encouraging healthy choices of fruits and vegetables and decreasing fast food intake  Rationale for BMI follow-up plan is due to patient being overweight or obese  Falls Plan of Care: balance, strength, and gait training instructions were provided  Preventive health issues were discussed with patient, and age appropriate screening tests were ordered as noted in patient's After Visit Summary    Personalized health advice and appropriate referrals for health education or preventive services given if needed, as noted in patient's After Visit Summary       History of Present Illness:     Patient presents for Medicare Annual Wellness visit    Patient Care Team:  Samuel Vee MD as PCP - General (Family Medicine)  Samuel Vee MD as PCP - [address] [place] [address] (RTE)  Vania Hubbard MD     Problem List:     Patient Active Problem List   Diagnosis    Allergic rhinitis    Anxiety state    Low back pain    Cervical polyp    Chronic pain disorder    Depression    Osteoarthritis of knee    Gastroesophageal reflux disease    Hemorrhoids    Hiatal hernia    Hyperlipidemia    Degeneration of lumbosacral intervertebral disc    Obesity    Osteopenia    Sjogren's syndrome ([address] )    Sleep apnea    Tachycardia    Urge incontinence    Uterovaginal prolapse, incomplete    Immunization due    Vitamin D insufficiency    Healthcare maintenance    Cataract    Current drug use    Kidney stone    Spinal stenosis of lumbar region    BMI 38 0-38 9,adult    Memory deficit    Exposure to COVID-19 virus    Viral infection, unspecified    Body mass index (BMI)40 0-44 9, adult    Morbid obesity (HCC)    Chronic pain of both knees    Injury of right shoulder    Flank pain    Chronic bilateral back pain    Stage 3a chronic kidney disease ([address] )    Hyperglycemia    Decreased GFR    Notalgia      Past Medical and Surgical History:     Past Medical History:   Diagnosis Date    Anxiety     Depression     GERD (gastroesophageal reflux disease)     Hyperlipidemia     Kidney stone     Vitamin D deficiency      Past Surgical History:   Procedure Laterality Date    CATARACT EXTRACTION      CYSTOTOMY      with direct removal of calculus    HEMORROIDECTOMY  2010    KIDNEY STONE SURGERY      requiring open removal on the left side    LITHOTRIPSY      POLYPECTOMY      REPLACEMENT TOTAL KNEE  2011    SINUS SURGERY      TONSILLECTOMY AND ADENOIDECTOMY        Family History:     Family History   Problem Relation Age of Onset    Heart attack Mother     Diverticulosis Father     Other Brother         back surgery and one brain surgery    Asthma Daughter       Social History:     Social History     Socioeconomic History    Marital status: /Civil Union     Spouse name: None    Number of children: None    Years of education: None    Highest education level: None   Occupational History    None   Tobacco Use    Smoking status: Former Smoker     Types: Cigarettes     Quit date:      Years since quittin 3    Smokeless tobacco: Never Used    Tobacco comment: no passive smoke exposure   Vaping Use    Vaping Use: Never used   Substance and Sexual Activity    Alcohol use: No    Drug use: No    Sexual activity: None   Other Topics Concern    None   Social History Narrative    None     Social Determinants of Health     Financial Resource Strain: Not on file   Food Insecurity: Not on file   Transportation Needs: Not on file   Physical Activity: Not on file   Stress: Not on file   Social Connections: Not on file   Intimate Partner Violence: Not on file   Housing Stability: Not on file      Medications and Allergies:     Current Outpatient Medications   Medication Sig Dispense Refill    ALPRAZolam (XANAX) 0 25 mg tablet TAKE ONE TABLET BY MOUTH TWICE A  tablet 0    Cholecalciferol (VITAMIN D3) 5000 units CAPS Take by mouth       HYDROcodone-acetaminophen (NORCO) 7 5-325 mg per tablet every 6 (six) hours      loratadine (CLARITIN) 10 mg tablet Take 10 mg by mouth daily      PARoxetine (PAXIL) 10 mg tablet Take 2 tablets (20 mg total) by mouth daily 180 tablet 1    rosuvastatin (CRESTOR) 10 MG tablet Take 1 tablet (10 mg total) by mouth daily 90 tablet 1    gabapentin (Neurontin) 100 mg capsule Take 1 capsule (100 mg total) by mouth 3 (three) times a day 90 capsule 3     No current facility-administered medications for this visit       Allergies   Allergen Reactions    Ciprofloxacin      Other reaction(s): headache,dizziness, chest pain    Acetaminophen      Pt states she currently takes without issue    Fentanyl     Propoxyphene       Immunizations:     Immunization History   Administered Date(s) Administered    INFLUENZA 10/31/2020    Influenza, high dose seasonal 0 7 mL 12/07/2018    Influenza, seasonal, injectable 10/12/2010    Pneumococcal Polysaccharide PPV23 02/12/1999, 10/30/2008    Tdap 06/28/2019      Health Maintenance:         Topic Date Due    Hepatitis C Screening  Never done         Topic Date Due    COVID-19 Vaccine (1) Never done    Influenza Vaccine (1) 09/01/2021      Medicare Health Risk Assessment:     There were no vitals taken for this visit  Eliezer Fam is here for her Subsequent Wellness visit  Health Risk Assessment:   Patient rates overall health as fair  Patient feels that their physical health rating is same  Patient is satisfied with their life  Eyesight was rated as same  Hearing was rated as same  Patient feels that their emotional and mental health rating is slightly worse  Patients states they are never, rarely angry  Patient states they are always unusually tired/fatigued  Pain experienced in the last 7 days has been some  Patient's pain rating has been 8/10  Patient states that she has experienced no weight loss or gain in last 6 months  Fall Risk Screening: In the past year, patient has experienced: history of falling in past year    Number of falls: 1  Injured during fall?: Yes    Feels unsteady when standing or walking?: Yes    Worried about falling?: Yes      Urinary Incontinence Screening:   Patient has not leaked urine accidently in the last six months  Home Safety:  Patient has trouble with stairs inside or outside of their home  Patient has working smoke alarms and has working carbon monoxide detector  Home safety hazards include: none  Nutrition:   Current diet is Regular       Medications:   Patient is currently taking over-the-counter supplements  OTC medications include: see medication list  Patient is not able to manage medications  Daughter take cares of medications    Activities of Daily Living (ADLs)/Instrumental Activities of Daily Living (IADLs):   Walk and transfer into and out of bed and chair?: Yes  Dress and groom yourself?: Yes    Bathe or shower yourself?: Yes    Feed yourself? Yes  Do your laundry/housekeeping?: Yes  Manage your money, pay your bills and track your expenses?: No  Make your own meals?: No    Do your own shopping?: No    Previous Hospitalizations:   Any hospitalizations or ED visits within the last 12 months?: No      Advance Care Planning:   Living will: No    Durable POA for healthcare: No    Advanced directive: No      Cognitive Screening:   Provider or family/friend/caregiver concerned regarding cognition?: No    PREVENTIVE SCREENINGS      Cardiovascular Screening:    General: Screening Not Indicated and History Lipid Disorder      Diabetes Screening:     General: Screening Current      Colorectal Cancer Screening:     General: Screening Not Indicated      Breast Cancer Screening:     General: Screening Not Indicated      Cervical Cancer Screening:    General: Screening Not Indicated      Osteoporosis Screening:    General: Risks and Benefits Discussed      Abdominal Aortic Aneurysm (AAA) Screening:        General: Screening Not Indicated      Lung Cancer Screening:     General: Screening Not Indicated      Hepatitis C Screening:    General: Risks and Benefits Discussed    Screening, Brief Intervention, and Referral to Treatment (SBIRT)    Screening  Typical number of drinks in a day: 0  Typical number of drinks in a week: 0  Interpretation: Low risk drinking behavior      Single Item Drug Screening:  How often have you used an illegal drug (including marijuana) or a prescription medication for non-medical reasons in the past year? never    Single Item Drug Screen Score: 0  Interpretation: Negative screen for possible drug use disorder    Brief Intervention  Alcohol & drug use screenings were reviewed  No concerns regarding substance use disorder identified       Review of Current Opioid Use    Opioid Risk Tool (ORT) Interpretation: Complete Opioid Risk Tool (ORT)      Arian Suero MD

## 2022-04-29 NOTE — PROGRESS NOTES
Assessment/Plan:  Gastroesophageal reflux disease  Stable without meds  Will continue to monitor  Stage 3a chronic kidney disease [facility])  Lab Results   Component Value Date    EGFR 58 10/12/2021    EGFR 49 08/23/2021    EGFR 68 12/20/2019    CREATININE 0 94 10/12/2021    CREATININE 1 08 08/23/2021    CREATININE 0 83 12/20/2019   Stable  Continue to encourage oral hydration  She was told to try to get her blood work done  Hyperglycemia  Continue to follow low carb diet  Will continue to monitor her A1c  Healthcare maintenance  It was discussed about immunizations, diet, exercise and safety measures  Hyperlipidemia  Continue on Crestor  Discussed about low-fat diet  Was told to get her blood work done  Chronic pain disorder  She was given prescription for Neurontin 100 mg 3 times a day  Discussed about possible side effects  Was discussed with patient and her daughter that we can increase the dose to 300 mg 3 times a day if needed  Diagnoses and all orders for this visit:    Other chronic back pain  -     gabapentin (Neurontin) 100 mg capsule; Take 1 capsule (100 mg total) by mouth 3 (three) times a day    Sjogren's syndrome, with unspecified organ involvement ([facility] [address] )    Morbid obesity ([facility] [address] )    Stage 3a chronic kidney disease ([facility] [address] [facility] )    Healthcare maintenance    Gastroesophageal reflux disease without esophagitis    Hyperglycemia    Mixed hyperlipidemia    Chronic pain disorder        There are no Patient Instructions on file for this visit  No follow-ups on file  Subjective:      Patient ID: Naeem Cunningham is a 80 y o  female  Chief Complaint   Patient presents with   [facility] Wellness Visit       She is here today for follow-up multiple medical problems  She has been taking her medications  She denies any side effects from her medications  She complains of pain in ache in her back and upper extremities specially the right upper arm    She was diagnosed with torn meniscus but she is not a candidate for surgery  Physical therapy did not help  She follow-up with her Ortho in she is on Vicodin for pain  She stated she only takes it if it is a severe pain  The following portions of the patient's history were reviewed and updated as appropriate: allergies, current medications, past family history, past medical history, past social history, past surgical history and problem list     Review of Systems   Constitutional: Negative for chills and fever  HENT: Negative for trouble swallowing  Respiratory: Negative for cough and shortness of breath  Cardiovascular: Negative for chest pain, palpitations and leg swelling  Gastrointestinal: Negative for abdominal pain, constipation and diarrhea  Endocrine: Negative for cold intolerance and heat intolerance  Musculoskeletal: Positive for arthralgias and back pain  Skin: Negative for rash  Neurological: Negative for dizziness and seizures  Psychiatric/Behavioral: Negative for behavioral problems  Current Outpatient Medications   Medication Sig Dispense Refill    ALPRAZolam (XANAX) 0 25 mg tablet TAKE ONE TABLET BY MOUTH TWICE A  tablet 0    Cholecalciferol (VITAMIN D3) 5000 units CAPS Take by mouth       HYDROcodone-acetaminophen (NORCO) 7 5-325 mg per tablet every 6 (six) hours      loratadine (CLARITIN) 10 mg tablet Take 10 mg by mouth daily      PARoxetine (PAXIL) 10 mg tablet Take 2 tablets (20 mg total) by mouth daily 180 tablet 1    rosuvastatin (CRESTOR) 10 MG tablet Take 1 tablet (10 mg total) by mouth daily 90 tablet 1    gabapentin (Neurontin) 100 mg capsule Take 1 capsule (100 mg total) by mouth 3 (three) times a day 90 capsule 3     No current facility-administered medications for this visit  Objective: There were no vitals taken for this visit  Physical Exam  Vitals and nursing note reviewed  Constitutional:       Appearance: Normal appearance  She is well-developed  HENT:      Head: Normocephalic and atraumatic  Eyes:      Pupils: Pupils are equal, round, and reactive to light  Cardiovascular:      Rate and Rhythm: Normal rate and regular rhythm  Heart sounds: Normal heart sounds  Pulmonary:      Effort: Pulmonary effort is normal       Breath sounds: Normal breath sounds  Abdominal:      General: Bowel sounds are normal       Palpations: Abdomen is soft  Musculoskeletal:      Cervical back: Normal range of motion and neck supple  Right lower leg: No edema  Left lower leg: No edema  Lymphadenopathy:      Cervical: No cervical adenopathy  Skin:     General: Skin is warm  Neurological:      Mental Status: She is alert  Mental status is at baseline                  Mattie Fontanez MD

## 2022-04-29 NOTE — ASSESSMENT & PLAN NOTE
She was given prescription for Neurontin 100 mg 3 times a day  Discussed about possible side effects  Was discussed with patient and her daughter that we can increase the dose to 300 mg 3 times a day if needed

## 2022-04-29 NOTE — ASSESSMENT & PLAN NOTE
Lab Results   Component Value Date    EGFR 58 10/12/2021    EGFR 49 08/23/2021    EGFR 68 12/20/2019    CREATININE 0 94 10/12/2021    CREATININE 1 08 08/23/2021    CREATININE 0 83 12/20/2019   Stable  Continue to encourage oral hydration  She was told to try to get her blood work done

## 2022-05-09 NOTE — TELEPHONE ENCOUNTER
Patient with acute kidney injury likely d/t Pre-renal azotemia and Acute tubular necrosis Which is currently worsening. Labs reviewed- Renal function/electrolytes with Estimated Creatinine Clearance: 10.3 mL/min (A) (based on SCr of 9.7 mg/dL (H)). according to latest data. Monitor urine output and serial BMP and adjust therapy as needed. Avoid nephrotoxins and renally dose meds for GFR listed above.     Baseline Cr 3-6, Cr 9 on admission  Hx of CKD: yes due to HTN/DM  Last saw nephro 2013  Exam with edema, no decreased O2 sats or rales  FeNA: not reliable, had already gotten lasix  FeUrea: pending  UA / UCx: proteinuria, glucuria  DDx: hypotension, prerenal, progression of CKD  No indications for HD at this time    Plan:   - nephro consulted, appreciate recs  - Trend Cr  - Strict I&Os  - Avoid nephrotoxic agents (NSAIDs, gadolinium, IV radiocontrast)  - Avoid hypotension  - Renal diet  - Renally adjust medications  - Transitioned from IV lasix to PO lasix 80mg BID.   Pt transferred to [facility] - [address]   [address] [place], [place] [address] [phone]   Sharla Schultz, DO

## 2022-06-21 DIAGNOSIS — F41.9 ANXIETY: ICD-10-CM

## 2022-06-21 RX ORDER — ALPRAZOLAM 0.25 MG/1
0.25 TABLET ORAL 2 TIMES DAILY
Qty: 120 TABLET | Refills: 0 | Status: SHIPPED | OUTPATIENT
Start: 2022-06-21

## 2022-06-21 NOTE — TELEPHONE ENCOUNTER
Lat seen 4/29/22     1 BARBARA HOENSCHEID 38- F [address] [place]           Search Criteria    Name Date of Birth Date Range   Aurora Ga [birth year]- [birth year] To [birth year]-     Requester Name Requested Date   Thomes  Tue Jun 21 2022 09:17:34 GMT-0400 [place] Daylight Time)     Summary   Prescriptions  7  Prescribers  2  Pharmacies  1  View Map    Drug Classes   Benzodiazepines  1  Stimulants  0  Opioids  6  Muscle Relaxants  0    Opioid Dosage   Total MME for Active Prescriptions  0    Average MME  10 00  MME Graph   MME Calculator       · Prescriptions  · Notifications    · Prescribers  · Pharmacies  · MME Graph      [] [place]   Drug Categories:      [] Opioids       [] Benzodiazepines       Show  entries  Search:  Patient Id Prescription #  Filled Written Drug Label Qty Days Strength MME** Prescriber Pharmacy Payment REFILL #/Auth State Detail   1  [id number] 05/04/2022 05/04/2022 HYDROcodone BITARTRATE-ACETAMINOPHE (Tablet)  60 0 30 325 MG-5 MG 10 0 302 [id number]  [place] 0 / 0 [place]    1  [id number] 03/17/2022 03/17/2022 HYDROcodone BITARTRATE-ACETAMINOPHE (Tablet)  60 0 30 325 MG-5 MG 10 0 302 [facility]  [place] 0 / 0 [place]    1  [id number] 01/11/2022 01/11/2022 HYDROcodone BITARTRATE-ACETAMINOPHE (Tablet)  60 0 30 325 MG-5 MG 10 0 302 [facility]  [place] 0 / [place]    1  [id number] 11/18/2021 11/18/2021 HYDROcodone BITARTRATE-ACETAMINOPHE (Tablet)  60 0 30 325 MG-5 MG 10 0 302 [facility]  [place] 0 / 0 [place]    1  [id number] 08/25/2021 08/25/2021 HYDROcodone BITARTRATE-ACETAMINOPHE (Tablet)  60 0 30 325 MG-5 MG 10 0 302 [facility]  [place] 0 / [place]    1  [id number] 08/09/2021 08/09/2021 ALPRAZolam (Tablet)  120 0 60 0 25 MG NA ANDREA) [address] [id number]  [place] 0 / 0 [place]    1  [id number] 06/30/2021 06/30/2021 HYDROcodone BITARTRATE-ACETAMINOPHE (Tablet)  60 0 30 325 MG-5 MG 10 0 [facility]

## 2022-06-26 DIAGNOSIS — F32.89 OTHER DEPRESSION: ICD-10-CM

## 2022-06-27 RX ORDER — PAROXETINE 10 MG/1
TABLET, FILM COATED ORAL
Qty: 180 TABLET | Refills: 1 | Status: SHIPPED | OUTPATIENT
Start: 2022-06-27

## 2022-08-15 DIAGNOSIS — F41.9 ANXIETY: ICD-10-CM

## 2022-08-15 NOTE — TELEPHONE ENCOUNTER
Last seen 4/29/22     1 BARBARA HOENSCHEID 37- F [address] [place]           Search Criteria    Name Date of Birth Date Range   Betty Morales 41- [birth year]- To [birth year]-     Requester Name Requested Date   Cindy Welsh Aug 15 2022 11:42:43 GMT-0400 Maribeth [place]ab[place] Daylight Time)     Summary   Prescriptions  6  Prescribers  2  Pharmacies  1  View Map    Drug Classes   Benzodiazepines  1  Stimulants  0  Opioids  5  Muscle Relaxants  0    Opioid Dosage   Total MME for Active Prescriptions  0    Average MME  10 00  MME Graph   MME Calculator       · Prescriptions  · Notifications    · Prescribers  · Pharmacies  · MME Graph      [] [place]   Drug Categories:      [] Benzodiazepines       [] Opioids       Show  entries  Search:  Patient Id Prescription #  Filled Written Drug Label Qty Days Strength MME** Prescriber Pharmacy Payment REFILL #/Auth State Detail   1  [id number] 06/21/2022 06/21/2022 ALPRAZolam (Tablet)  120 0 60 0 25 MG NA ANDREA) [address] [id number]  [place] 0 / [place]    1  [id number] 05/04/2022 05/04/2022 HYDROcodone BITARTRATE-ACETAMINOPHE (Tablet)  60 0 30 325 MG-5 MG 10 0 302 [facility]  [place] 0 / 0 [place]    1  [id number] 03/17/2022 03/17/2022 HYDROcodone BITARTRATE-ACETAMINOPHE (Tablet)  60 0 30 325 MG-5 MG 10 0 302 [facility]  Med[place] 0 / 0 [place]    1  [id number] 01/11/2022 01/11/2022 HYDROcodone BITARTRATE-ACETAMINOPHE (Tablet)  60 0 30 325 MG-5 MG 10 0 302 [facility]  Medic[place] 0 / 0 [place]    1  [id number] 11/18/2021 11/18/2021 HYDROcodone BITARTRATE-ACETAMINOPHE (Tablet)  60 0 30 325 MG-5 MG 10 0 302 [facility]  Medicare 0 / 0 [place]    1  [id number] 08/25/2021 08/25/2021 HYDROcodone BITARTRATE-ACETAMINOPHE (Tablet)  60 0 30 325 MG-5 MG 10 0 Naeem Kinney

## 2022-08-16 RX ORDER — ALPRAZOLAM 0.25 MG/1
0.25 TABLET ORAL 2 TIMES DAILY
Qty: 120 TABLET | Refills: 0 | Status: SHIPPED | OUTPATIENT
Start: 2022-08-16

## 2022-10-13 DIAGNOSIS — E78.2 MIXED HYPERLIPIDEMIA: ICD-10-CM

## 2022-10-13 RX ORDER — ROSUVASTATIN CALCIUM 10 MG/1
TABLET, COATED ORAL
Qty: 90 TABLET | Refills: 0 | Status: SHIPPED | OUTPATIENT
Start: 2022-10-13

## 2022-12-08 DIAGNOSIS — F32.89 OTHER DEPRESSION: ICD-10-CM

## 2022-12-09 RX ORDER — PAROXETINE 10 MG/1
TABLET, FILM COATED ORAL
Qty: 180 TABLET | Refills: 0 | Status: SHIPPED | OUTPATIENT
Start: 2022-12-09

## 2022-12-26 DIAGNOSIS — F41.9 ANXIETY: ICD-10-CM

## 2022-12-27 RX ORDER — ALPRAZOLAM 0.25 MG/1
TABLET ORAL
Qty: 60 TABLET | Refills: 0 | Status: SHIPPED | OUTPATIENT
Start: 2022-12-27

## 2022-12-27 NOTE — TELEPHONE ENCOUNTER
Pharmacy requesting refill on xanax  Last seen 4/29/22  Sent message on to [employer] to make follow up appt      1 Niya Patel 13- [address] Criteria    Name Date of Birth Date Range   Bhavna Ramirez 13- 12- To 14-     Requester Name Requested Date   Kiki Morrison Tue Dec 27 2022 09:32:34 GMT-0500 Marc Simmonds Standard Time)     Summary   Prescriptions  8  Prescribers  2  Pharmacies  2  View Map  Drug Classes   Benzodiazepines  2  Stimulants  0  Opioids  6  Muscle Relaxants  0  Opioid Dosage   Total MME for Active Prescriptions  0    Average MME  10 00  MME Graph   MME Calculator     • Prescriptions  • Notifications    • Prescribers  • Pharmacies  • MME Graph    [] [place]   Drug Categories:      [] Opioids       [] Benzodiazepines       Show  entries  Search:  Patient Id Prescription #  Filled Written Drug Label Qty Days Strength MME** Prescriber Pharmacy Payment REFILL #/Auth State Detail   1  [id number] 10/30/2022  10/30/2022 HYDROcodone BITARTRATE-ACETAMINOPHE (Tablet)  60 0 30 325 MG-5 MG 10 0 302 [facility]  [place] 0 / [place]    1  [id number] 09/15/2022  09/15/2022 HYDROcodone BITARTRATE-ACETAMINOPHE (Tablet)  60 0 30 325 MG-5 MG 10 0 302 [facility]  Med[place] 0 / 0 [place]    1  [id number] 08/17/2022 08/16/2022 ALPRAZolam (Tablet)  120 0 60 0 25 MG GUILLERMO MENDEZ) [facility]  [facility]  Med[place] 0 / 0 [place]    1  [id number] 07/09/2022 07/09/2022 HYDROcodone BITARTRATE-ACETAMINOPHE (Tablet)  60 0 30 325 MG-5 MG 10 0 302 [facility]  Medic[place] 0 / 0 [place]    1  [id number] 06/21/2022 06/21/2022 ALPRAZolam (Tablet)  120 0 60 0 25 MG NA ANDREA) 4[address] [facility]  Med[place]are 0 / 0 [place]    1  [id number] 05/04/2022 05/04/2022 HYDROcodone BITARTRATE-ACETAMINOPHE (Tablet)  60 0 30 325 MG-5 MG 10 0 302 [facility]  Medicare 0 / 0 [place]    1  [id number] 03/17/2022 03/17/2022 HYDROcodone BITARTRATE-ACETAMINOPHE (Tablet)  60 0 30 325 MG-5 MG 10 0 302 [facility]  Medicare 0 / 0 [place]    1  [id number] 01/11/2022 01/11/2022 HYDROcodone BITARTRATE-ACETAMINOPHE (Tablet)  60 0 30 325 MG-5 MG 10 0 Deja Gallardo

## 2023-02-19 DIAGNOSIS — F32.89 OTHER DEPRESSION: ICD-10-CM

## 2023-02-20 RX ORDER — PAROXETINE 10 MG/1
TABLET, FILM COATED ORAL
Qty: 180 TABLET | Refills: 0 | Status: SHIPPED | OUTPATIENT
Start: 2023-02-20

## 2023-02-26 DIAGNOSIS — M25.561 CHRONIC PAIN OF BOTH KNEES: ICD-10-CM

## 2023-02-26 DIAGNOSIS — G89.29 CHRONIC PAIN OF BOTH KNEES: ICD-10-CM

## 2023-02-26 DIAGNOSIS — E78.2 MIXED HYPERLIPIDEMIA: ICD-10-CM

## 2023-02-26 DIAGNOSIS — M25.562 CHRONIC PAIN OF BOTH KNEES: ICD-10-CM

## 2023-02-26 DIAGNOSIS — R73.9 HYPERGLYCEMIA: Primary | ICD-10-CM

## 2023-02-28 RX ORDER — ROSUVASTATIN CALCIUM 10 MG/1
TABLET, COATED ORAL
Qty: 90 TABLET | Refills: 0 | Status: SHIPPED | OUTPATIENT
Start: 2023-02-28

## 2023-02-28 NOTE — TELEPHONE ENCOUNTER
I called and scheduled pt for 3/20/23 and I refilled her medication as directed on med list  She also requested bw to be done and I placed order based on last lab orders

## 2023-03-12 DIAGNOSIS — F41.9 ANXIETY: ICD-10-CM

## 2023-03-13 NOTE — TELEPHONE ENCOUNTER
Pt scheduled for 3/20/23 and I copied past refills into chart from [facility] web site and sent for provider approval          PATIENT ID NAME D O B   [address] [place] [address]    [] 1 Jaime Troy 39- F hugoe 6 [facility] [place]           Search Criteria    NAME DATE OF BIRTH DATE RANGE   Jenel Hanly hoenscheid 75- [birth year]- To [birth year]-     REQUESTER NAME REQUESTED DATE   Daune Corporal Mon Mar 13 2023 07:34:16 GMT-0400 (Eastern Daylight Time)     Summary  Prescriptions  9  Prescribers  2  Pharmacies  2  View Map  Drug Classes  Benzodiazepines  3  Stimulants  0  Opioids  6  Muscle Relaxants  0  Opioid Dosage  Total MME for Active Prescriptions  0    Average MME  10 00  MME Graph   MME Calculator     • Prescriptions  • Notifications  1  • Prescribers  • Pharmacies  • MME Graph    [] PA Drug Categories:     [] Benzodiazepines      [] Opioids      Showentries  Search:  PATIENT ID PRESCRIPTION # FILLED WRITTEN DRUG LABEL QTY DAYS STRENGTH MME** PRESCRIBER PHARMACY PAYMENT REFILL #/AUTH STATE DETAIL   1 [id number] 12/29/2022 12/27/2022 ALPRAZolam (Tablet) 60 0 30 0 25 MG NA ANDREA) ABOMRGOPI [facility] [place] 0 / 0 [place]    1 [id number] 12/27/2022 12/27/2022 ACETAMINOPHEN 325 MG / HYDROcodone BITARTRATE 5 MG ORAL TABLET (Tablet) 60 0 30 5 0 MG/325 0 MG 10 0 [address] [place] [id number] Medicare 0 / 0 [place]    1 [id number] 10/30/2022 10/30/2022 ACETAMINOPHEN 325 MG / HYDROcodone BITARTRATE 5 MG ORAL TABLET (Tablet) 60 0 30 5 0 MG/325 0 MG 10 0 [address] [place] [facility] Medicare 0 / 0 [place]    1 [id number] 09/15/2022 09/15/2022 ACETAMINOPHEN 325 MG / HYDROcodone BITARTRATE 5 MG ORAL TABLET (Tablet) 60 0 30 5 0 MG/325 0 MG 10 0 [address] [place] [facility]19 Medicare 0 / 0 [place]    1 [id number] 08/17/2022 08/16/2022 ALPRAZolam (Tablet) 120 0 60 0 25 MG NA ANDREA) ABOSAMRA [facility] [place] 0 / 0 [place]    1 [id number] 07/09/2022 07/09/2022 ACETAMINOPHEN 325 MG / HYDROcodone BITARTRATE 5 MG ORAL TABLET (Tablet) 60 0 30 5 0 MG/325 0 MG 10 0 [address] [place] [facility]6260 Medicare 0 / 0 [place]    1 [id number] 06/21/2022 06/21/2022 ALPRAZolam (Tablet) 120 0 60 0 25 MG GUILLERMO MENDEZ) [id number] Medicare 0 / 0 [place]    1 [id number] 05/04/2022 05/04/2022 ACETAMINOPHEN 325 MG / HYDROcodone BITARTRATE 5 MG ORAL TABLET (Tablet) 60 0 30 5 0 MG/325 0 MG 10 0 [address] [place] [facility]9372 Medicare 0 / 0 [place]    1 [id number] 03/17/2022 03/17/2022 ACETAMINOPHEN 325 MG / HYDROcodone BITARTRATE 5 MG ORAL TABLET (Tablet) 60 0 30 5 0 MG/325 0 MG 10 0 [address] [place] [facility]3070 Medicare 0 / 0 [place]    Showing 1 to 9 of 9 entries  [id number]     * Per CDC guidance, the conversion factors and associated daily morphine milligram equivalents for drugs prescribed as part of medication-assisted treatment for opioid use disorder should not be used to benchmark against dosage thresholds meant for opioids prescribed for pain  Report Disclaimer:  Information from the [place] [facility] [address] Program (PDMP) database is protected health information and any information accessed must be treated as confidential  Any person who knowingly or intentionally makes an unauthorized disclosure of information from the [address] [place] [address] Laws will be subject to civil and criminal penalties as set forth in the ABC-[employer] Act 2014-191, Act of Oct  27, 2014, P L  2911  The information in the [address] [place] [address] Lasw is submitted by pharmacies and may contain errors and omissions  Independent verification of prescription information with pharmacies and prescribers may sometimes be prudent or necessary    Educational content  CDC MME calculation guidelines  [place] Prescribing Guidelines  Letter Regarding the Misapplication of Prescribing Guidelines   Guide for Appropriate Tapering or Discontinuation of Long-Term Opioid Use   #[id number]

## 2023-03-14 RX ORDER — ALPRAZOLAM 0.25 MG/1
TABLET ORAL
Qty: 60 TABLET | Refills: 0 | Status: SHIPPED | OUTPATIENT
Start: 2023-03-14

## 2023-03-20 ENCOUNTER — OFFICE VISIT (OUTPATIENT)
Dept: FAMILY MEDICINE CLINIC | Facility: CLINIC | Age: 83
End: 2023-03-20

## 2023-03-20 VITALS
BODY MASS INDEX: 39.75 KG/M2 | WEIGHT: 216 LBS | HEIGHT: 62 IN | RESPIRATION RATE: 16 BRPM | OXYGEN SATURATION: 94 % | HEART RATE: 80 BPM | SYSTOLIC BLOOD PRESSURE: 126 MMHG | DIASTOLIC BLOOD PRESSURE: 70 MMHG | TEMPERATURE: 98.3 F

## 2023-03-20 DIAGNOSIS — F32.89 OTHER DEPRESSION: ICD-10-CM

## 2023-03-20 DIAGNOSIS — E78.2 MIXED HYPERLIPIDEMIA: ICD-10-CM

## 2023-03-20 DIAGNOSIS — N18.31 STAGE 3A CHRONIC KIDNEY DISEASE (HCC): ICD-10-CM

## 2023-03-20 DIAGNOSIS — M35.00 SJOGREN'S SYNDROME, WITH UNSPECIFIED ORGAN INVOLVEMENT (HCC): ICD-10-CM

## 2023-03-20 DIAGNOSIS — R73.9 HYPERGLYCEMIA: ICD-10-CM

## 2023-03-20 DIAGNOSIS — E66.01 SEVERE OBESITY (BMI 35.0-39.9) WITH COMORBIDITY (HCC): Primary | ICD-10-CM

## 2023-03-20 RX ORDER — ROSUVASTATIN CALCIUM 10 MG/1
10 TABLET, COATED ORAL DAILY
Qty: 90 TABLET | Refills: 0 | Status: SHIPPED | OUTPATIENT
Start: 2023-03-20

## 2023-03-20 RX ORDER — PAROXETINE 10 MG/1
20 TABLET, FILM COATED ORAL DAILY
Qty: 180 TABLET | Refills: 0 | Status: SHIPPED | OUTPATIENT
Start: 2023-03-20

## 2023-03-20 NOTE — ASSESSMENT & PLAN NOTE
Continue Crestor 10 mg daily  Discussed about low-fat diet and regular exercise  I am going to check fasting lipid profile

## 2023-03-20 NOTE — PROGRESS NOTES
Name: Mine Unger      : 1940      MRN: [id number]  Encounter Provider: Shaq Gunn MD  Encounter Date: 3/20/2023   Encounter department: [address] [place] [address]  Severe obesity (BMI 35 0-39  9) with comorbidity [facility])  Assessment & Plan: It was discussed about low-carb diet and we will continue to monitor  2  Mixed hyperlipidemia  Assessment & Plan:  Continue Crestor 10 mg daily  Discussed about low-fat diet and regular exercise  I am going to check fasting lipid profile  Orders:  -     rosuvastatin (CRESTOR) 10 MG tablet; Take 1 tablet (10 mg total) by mouth daily  -     CBC and differential; Future  -     Comprehensive metabolic panel; Future  -     Lipid Panel with Direct LDL reflex; Future  -     TSH, 3rd generation with Free T4 reflex; Future    3  Other depression  Assessment & Plan:  Stable on Paxil  Continue same  We will continue to monitor  Orders:  -     PARoxetine (PAXIL) 10 mg tablet; Take 2 tablets (20 mg total) by mouth daily    4  Hyperglycemia  Assessment & Plan:  Discussed about low-carb diet  Continue to monitor A1c and fasting glucose  Orders:  -     Hemoglobin A1C; Future    5  Sjogren's syndrome, with unspecified organ involvement ([facility] [address] )  Assessment & Plan:  Stable  Continue to monitor  6  Stage 3a chronic kidney disease [facility])  Assessment & Plan:  Lab Results   Component Value Date    EGFR 58 10/12/2021    EGFR 49 2021    EGFR 68 2019    CREATININE 0 94 10/12/2021    CREATININE 1 08 2021    CREATININE 0 83 2019   GFR is stable  Continue to encourage oral hydration  We will continue to monitor GFR  Subjective     She is here today for follow-up multiple medical problems  She has been taking her medications  Denies any side effects  She did not get her blood work done yet  She denies any other complaint      Review of Systems   Constitutional: Negative for chills and fever    HENT: Negative for trouble swallowing  Eyes: Negative for visual disturbance  Respiratory: Negative for cough and shortness of breath  Cardiovascular: Negative for chest pain, palpitations and leg swelling  Gastrointestinal: Negative for abdominal pain, constipation and diarrhea  Endocrine: Negative for cold intolerance and heat intolerance  Genitourinary: Negative for difficulty urinating and dysuria  Musculoskeletal: Negative for gait problem  Skin: Negative for rash  Neurological: Negative for dizziness, tremors, seizures and headaches  Hematological: Negative for adenopathy  Psychiatric/Behavioral: Negative for behavioral problems         Past Medical History:   Diagnosis Date   • Anxiety    • Depression    • GERD (gastroesophageal reflux disease)    • Hyperlipidemia    • Kidney stone    • Vitamin D deficiency      Past Surgical History:   Procedure Laterality Date   • CATARACT EXTRACTION     • CYSTOTOMY      with direct removal of calculus   • HEMORROIDECTOMY     • KIDNEY STONE SURGERY      requiring open removal on the left side   • LITHOTRIPSY     • POLYPECTOMY     • REPLACEMENT TOTAL KNEE     • SINUS SURGERY     • TONSILLECTOMY AND ADENOIDECTOMY       Family History   Problem Relation Age of Onset   • Heart attack Mother    • Diverticulosis Father    • Other Brother         back surgery and one brain surgery   • Asthma Daughter      Social History     Socioeconomic History   • Marital status: /Civil Union     Spouse name: None   • Number of children: None   • Years of education: None   • Highest education level: None   Occupational History   • None   Tobacco Use   • Smoking status: Former     Types: Cigarettes     Quit date:      Years since quittin 2   • Smokeless tobacco: Never   • Tobacco comments:     no passive smoke exposure   Vaping Use   • Vaping Use: Never used   Substance and Sexual Activity   • Alcohol use: No   • Drug use: No   • Sexual activity: None   Other Topics Concern   • None   Social History Narrative   • None     Social Determinants of Health     Financial Resource Strain: Not on file   Food Insecurity: Not on file   Transportation Needs: Not on file   Physical Activity: Not on file   Stress: Not on file   Social Connections: Not on file   Intimate Partner Violence: Not on file   Housing Stability: Not on file     Current Outpatient Medications on File Prior to Visit   Medication Sig   • ALPRAZolam (XANAX) 0 25 mg tablet TAKE 1 TABLET BY MOUTH TWICE  DAILY   • Cholecalciferol (VITAMIN D3) 5000 units CAPS Take by mouth    • HYDROcodone-acetaminophen (NORCO) 7 5-325 mg per tablet every 6 (six) hours   • loratadine (CLARITIN) 10 mg tablet Take 10 mg by mouth daily   • [DISCONTINUED] PARoxetine (PAXIL) 10 mg tablet TAKE 2 TABLETS BY MOUTH DAILY   • [DISCONTINUED] rosuvastatin (CRESTOR) 10 MG tablet TAKE 1 TABLET BY MOUTH  DAILY   • gabapentin (Neurontin) 100 mg capsule Take 1 capsule (100 mg total) by mouth 3 (three) times a day (Patient not taking: Reported on 3/20/2023)     Allergies   Allergen Reactions   • Ciprofloxacin      Other reaction(s): headache,dizziness, chest pain   • Acetaminophen      Pt states she currently takes without issue   • Fentanyl    • Propoxyphene      Immunization History   Administered Date(s) Administered   • INFLUENZA 10/31/2020   • Influenza, high dose seasonal 0 7 mL 12/07/2018   • Influenza, seasonal, injectable 10/12/2010   • Pneumococcal Polysaccharide PPV23 02/12/1999, 10/30/2008   • Tdap 06/28/2019       Objective     /70 (BP Location: Left arm, Patient Position: Sitting, Cuff Size: Large)   Pulse 80   Temp 98 3 °F (36 8 °C) (Tympanic)   Resp 16   Ht 5' 2" (1 575 m)   Wt 98 kg (216 lb)   SpO2 94%   BMI 39 51 kg/m²     Physical Exam  Vitals and nursing note reviewed  Constitutional:       Appearance: Normal appearance  She is well-developed  HENT:      Head: Normocephalic and atraumatic  Eyes:      Pupils: Pupils are equal, round, and reactive to light  Cardiovascular:      Rate and Rhythm: Normal rate and regular rhythm  Heart sounds: Normal heart sounds  Pulmonary:      Effort: Pulmonary effort is normal       Breath sounds: Normal breath sounds  Abdominal:      General: Bowel sounds are normal       Palpations: Abdomen is soft  Musculoskeletal:      Cervical back: Normal range of motion and neck supple  Right lower leg: No edema  Left lower leg: No edema  Lymphadenopathy:      Cervical: No cervical adenopathy  Skin:     General: Skin is warm  Neurological:      Mental Status: She is alert  Mental status is at baseline  Cranial Nerves: No cranial nerve deficit  Todd Soni MD BMI Counseling: Body mass index is 39 51 kg/m²  The BMI is above normal  Nutrition recommendations include reducing portion sizes, decreasing overall calorie intake and 3-5 servings of fruits/vegetables daily

## 2023-03-20 NOTE — ASSESSMENT & PLAN NOTE
Lab Results   Component Value Date    EGFR 58 10/12/2021    EGFR 49 08/23/2021    EGFR 68 12/20/2019    CREATININE 0 94 10/12/2021    CREATININE 1 08 08/23/2021    CREATININE 0 83 12/20/2019   GFR is stable  Continue to encourage oral hydration  We will continue to monitor GFR

## 2023-06-21 ENCOUNTER — TELEPHONE (OUTPATIENT)
Dept: FAMILY MEDICINE CLINIC | Facility: CLINIC | Age: 83
End: 2023-06-21

## 2023-06-21 NOTE — TELEPHONE ENCOUNTER
Call from [facility] on aging asking if we have any concerns about this patient  I indicated that we would a written request to give information  I said we have not heard of any concerns and she comes in regularly    Agency was satisfied with this

## 2023-06-27 DIAGNOSIS — F32.89 OTHER DEPRESSION: ICD-10-CM

## 2023-06-27 DIAGNOSIS — E78.2 MIXED HYPERLIPIDEMIA: ICD-10-CM

## 2023-06-27 RX ORDER — ROSUVASTATIN CALCIUM 10 MG/1
10 TABLET, COATED ORAL DAILY
Qty: 90 TABLET | Refills: 1 | Status: SHIPPED | OUTPATIENT
Start: 2023-06-27

## 2023-06-27 RX ORDER — PAROXETINE 10 MG/1
20 TABLET, FILM COATED ORAL DAILY
Qty: 180 TABLET | Refills: 1 | Status: SHIPPED | OUTPATIENT
Start: 2023-06-27

## 2023-07-08 ENCOUNTER — APPOINTMENT (OUTPATIENT)
Dept: LAB | Facility: IMAGING CENTER | Age: 83
End: 2023-07-08
Payer: COMMERCIAL

## 2023-07-08 DIAGNOSIS — M25.561 CHRONIC PAIN OF BOTH KNEES: ICD-10-CM

## 2023-07-08 DIAGNOSIS — R73.9 HYPERGLYCEMIA: ICD-10-CM

## 2023-07-08 DIAGNOSIS — G89.29 CHRONIC PAIN OF BOTH KNEES: ICD-10-CM

## 2023-07-08 DIAGNOSIS — E78.2 MIXED HYPERLIPIDEMIA: ICD-10-CM

## 2023-07-08 DIAGNOSIS — M25.562 CHRONIC PAIN OF BOTH KNEES: ICD-10-CM

## 2023-07-08 LAB
ALBUMIN SERPL BCP-MCNC: 3.7 G/DL (ref 3.5–5)
ALP SERPL-CCNC: 114 U/L (ref 46–116)
ALT SERPL W P-5'-P-CCNC: 23 U/L (ref 12–78)
ANION GAP SERPL CALCULATED.3IONS-SCNC: 5 MMOL/L
AST SERPL W P-5'-P-CCNC: 19 U/L (ref 5–45)
BASOPHILS # BLD AUTO: 0.03 THOUSANDS/ÂΜL (ref 0–0.1)
BASOPHILS NFR BLD AUTO: 1 % (ref 0–1)
BILIRUB SERPL-MCNC: 0.45 MG/DL (ref 0.2–1)
BUN SERPL-MCNC: 13 MG/DL (ref 5–25)
CALCIUM SERPL-MCNC: 9.6 MG/DL (ref 8.3–10.1)
CHLORIDE SERPL-SCNC: 110 MMOL/L (ref 96–108)
CHOLEST SERPL-MCNC: 203 MG/DL
CO2 SERPL-SCNC: 25 MMOL/L (ref 21–32)
CREAT SERPL-MCNC: 1.02 MG/DL (ref 0.6–1.3)
EOSINOPHIL # BLD AUTO: 0.13 THOUSAND/ÂΜL (ref 0–0.61)
EOSINOPHIL NFR BLD AUTO: 2 % (ref 0–6)
ERYTHROCYTE [DISTWIDTH] IN BLOOD BY AUTOMATED COUNT: 14.7 % (ref 11.6–15.1)
GFR SERPL CREATININE-BSD FRML MDRD: 51 ML/MIN/1.73SQ M
GLUCOSE P FAST SERPL-MCNC: 104 MG/DL (ref 65–99)
HCT VFR BLD AUTO: 45.6 % (ref 34.8–46.1)
HDLC SERPL-MCNC: 70 MG/DL
HGB BLD-MCNC: 14.2 G/DL (ref 11.5–15.4)
IMM GRANULOCYTES # BLD AUTO: 0.02 THOUSAND/UL (ref 0–0.2)
IMM GRANULOCYTES NFR BLD AUTO: 0 % (ref 0–2)
LDLC SERPL CALC-MCNC: 104 MG/DL (ref 0–100)
LYMPHOCYTES # BLD AUTO: 2.16 THOUSANDS/ÂΜL (ref 0.6–4.47)
LYMPHOCYTES NFR BLD AUTO: 36 % (ref 14–44)
MCH RBC QN AUTO: 27.3 PG (ref 26.8–34.3)
MCHC RBC AUTO-ENTMCNC: 31.1 G/DL (ref 31.4–37.4)
MCV RBC AUTO: 88 FL (ref 82–98)
MONOCYTES # BLD AUTO: 0.51 THOUSAND/ÂΜL (ref 0.17–1.22)
MONOCYTES NFR BLD AUTO: 9 % (ref 4–12)
NEUTROPHILS # BLD AUTO: 3.17 THOUSANDS/ÂΜL (ref 1.85–7.62)
NEUTS SEG NFR BLD AUTO: 52 % (ref 43–75)
NRBC BLD AUTO-RTO: 0 /100 WBCS
PLATELET # BLD AUTO: 285 THOUSANDS/UL (ref 149–390)
PMV BLD AUTO: 11.3 FL (ref 8.9–12.7)
POTASSIUM SERPL-SCNC: 4.2 MMOL/L (ref 3.5–5.3)
PROT SERPL-MCNC: 7.8 G/DL (ref 6.4–8.4)
RBC # BLD AUTO: 5.2 MILLION/UL (ref 3.81–5.12)
SODIUM SERPL-SCNC: 140 MMOL/L (ref 135–147)
TRIGL SERPL-MCNC: 144 MG/DL
TSH SERPL DL<=0.05 MIU/L-ACNC: 2.73 UIU/ML (ref 0.45–4.5)
WBC # BLD AUTO: 6.02 THOUSAND/UL (ref 4.31–10.16)

## 2023-07-08 PROCEDURE — 36415 COLL VENOUS BLD VENIPUNCTURE: CPT

## 2023-07-08 PROCEDURE — 80053 COMPREHEN METABOLIC PANEL: CPT

## 2023-07-08 PROCEDURE — 83036 HEMOGLOBIN GLYCOSYLATED A1C: CPT

## 2023-07-08 PROCEDURE — 84443 ASSAY THYROID STIM HORMONE: CPT

## 2023-07-08 PROCEDURE — 80061 LIPID PANEL: CPT

## 2023-07-08 PROCEDURE — 85025 COMPLETE CBC W/AUTO DIFF WBC: CPT

## 2023-07-11 LAB
EST. AVERAGE GLUCOSE BLD GHB EST-MCNC: 108 MG/DL
HBA1C MFR BLD: 5.4 %

## 2023-07-17 ENCOUNTER — OFFICE VISIT (OUTPATIENT)
Dept: FAMILY MEDICINE CLINIC | Facility: CLINIC | Age: 83
End: 2023-07-17
Payer: COMMERCIAL

## 2023-07-17 VITALS
BODY MASS INDEX: 39.32 KG/M2 | DIASTOLIC BLOOD PRESSURE: 70 MMHG | SYSTOLIC BLOOD PRESSURE: 124 MMHG | TEMPERATURE: 99.6 F | WEIGHT: 213.7 LBS | HEIGHT: 62 IN | OXYGEN SATURATION: 95 % | RESPIRATION RATE: 16 BRPM | HEART RATE: 88 BPM

## 2023-07-17 DIAGNOSIS — Z00.00 MEDICARE ANNUAL WELLNESS VISIT, SUBSEQUENT: ICD-10-CM

## 2023-07-17 DIAGNOSIS — R01.1 HEART MURMUR: ICD-10-CM

## 2023-07-17 DIAGNOSIS — F32.89 OTHER DEPRESSION: ICD-10-CM

## 2023-07-17 DIAGNOSIS — J30.89 SEASONAL ALLERGIC RHINITIS DUE TO OTHER ALLERGIC TRIGGER: ICD-10-CM

## 2023-07-17 DIAGNOSIS — R73.9 HYPERGLYCEMIA: ICD-10-CM

## 2023-07-17 DIAGNOSIS — E78.2 MIXED HYPERLIPIDEMIA: ICD-10-CM

## 2023-07-17 DIAGNOSIS — N18.31 STAGE 3A CHRONIC KIDNEY DISEASE (HCC): Primary | ICD-10-CM

## 2023-07-17 PROCEDURE — 99214 OFFICE O/P EST MOD 30 MIN: CPT | Performed by: FAMILY MEDICINE

## 2023-07-17 PROCEDURE — G0439 PPPS, SUBSEQ VISIT: HCPCS | Performed by: FAMILY MEDICINE

## 2023-07-17 NOTE — PROGRESS NOTES
Name: Franchesca Castillo      : 1940      MRN: [id number]  Encounter Provider: Estefany Still MD  Encounter Date: 2023   Encounter department: [facility]     1. Stage 3a chronic kidney disease [facility])  Assessment & Plan:  Lab Results   Component Value Date    EGFR 51 2023    EGFR 58 10/12/2021    EGFR 49 2021    CREATININE 1.02 2023    CREATININE 0.94 10/12/2021    CREATININE 1.08 2021     Cre at baseline  GFR 51  Repeat CMP in 6 months       2. Other depression  Assessment & Plan:  Continue paxil 10mg BID   Well controlled       3. Mixed hyperlipidemia  Assessment & Plan:  Continue crestor 10mg daily for HLD  Repeat CBC, CMP, fasting lipid panel in 6 months       4. Hyperglycemia  Assessment & Plan:  Repeat A1C and CMP in 6 months  tirzepatide 2.5mg injection ordered for hyperglycemia     Orders:  -     tirzepatide 2.5 MG/0.5ML; Inject 0.5 mL (2.5 mg total) under the skin every 7 days    5. Seasonal allergic rhinitis due to other allergic trigger  Assessment & Plan:  Continue loratadine 10mg daily for seasonal allergies       6. Heart murmur  Assessment & Plan:  Systolic murmur heard on exam  2D ECHO ordered  Pt asymptomatic at this time - denies any chest pain, SOB, DOWD, fatigue, palpitations, syncope     Orders:  -     Echo complete w/ contrast if indicated; Future; Expected date: 2023    7. Medicare annual wellness visit, subsequent  Assessment & Plan: It was discussed about immunizations, diet, exercise and safety measures. Subjective     Pt is an 83 yo F PMH of stage 3 CKD, depression, anxiety, HLD, hyperglycemia, chronic low back pain who presents today for follow up. Pt is here with her daughter. No complaints today. Denies any fevers, chills, unintentional weight loss, SOB, chest pain, palpitations, cough, congestion, abd pain, N/V/D, constipation, dysuria, blood in stool.  Pt is well controlled on her medications and denies any side effects of medications. Fasting BG slightly elevated. Review of Systems   Constitutional: Negative for activity change, appetite change, chills, diaphoresis, fatigue, fever and unexpected weight change. HENT: Negative for ear pain and sore throat. Eyes: Negative for pain and visual disturbance. Respiratory: Negative for cough, chest tightness, shortness of breath and wheezing. Cardiovascular: Negative for chest pain, palpitations and leg swelling. Gastrointestinal: Negative for abdominal pain, diarrhea, nausea and vomiting. Genitourinary: Negative for dysuria and hematuria. Musculoskeletal: Negative for arthralgias and back pain. Skin: Negative for color change and rash. Neurological: Negative for seizures, syncope, light-headedness and headaches. All other systems reviewed and are negative.       Past Medical History:   Diagnosis Date   • Anxiety    • Depression    • GERD (gastroesophageal reflux disease)    • Hyperlipidemia    • Kidney stone    • Vitamin D deficiency      Past Surgical History:   Procedure Laterality Date   • CATARACT EXTRACTION     • CYSTOTOMY      with direct removal of calculus   • HEMORROIDECTOMY     • KIDNEY STONE SURGERY      requiring open removal on the left side   • LITHOTRIPSY     • POLYPECTOMY     • REPLACEMENT TOTAL KNEE     • SINUS SURGERY     • TONSILLECTOMY AND ADENOIDECTOMY       Family History   Problem Relation Age of Onset   • Heart attack Mother    • Diverticulosis Father    • Other Brother         back surgery and one brain surgery   • Asthma Daughter      Social History     Socioeconomic History   • Marital status: /Civil Union     Spouse name: None   • Number of children: None   • Years of education: None   • Highest education level: None   Occupational History   • None   Tobacco Use   • Smoking status: Former     Types: Cigarettes     Quit date:      Years since quittin.5   • Smokeless tobacco: Never   • Tobacco comments:     no passive smoke exposure   Vaping Use   • Vaping Use: Never used   Substance and Sexual Activity   • Alcohol use: No   • Drug use: No   • Sexual activity: None   Other Topics Concern   • None   Social History Narrative   • None     Social Determinants of Health     Financial Resource Strain: Not on file   Food Insecurity: Not on file   Transportation Needs: Not on file   Physical Activity: Not on file   Stress: Not on file   Social Connections: Not on file   Intimate Partner Violence: Not on file   Housing Stability: Not on file     Current Outpatient Medications on File Prior to Visit   Medication Sig   • ALPRAZolam (XANAX) 0.25 mg tablet TAKE 1 TABLET BY MOUTH TWICE  DAILY   • Cholecalciferol (VITAMIN D3) 5000 units CAPS Take by mouth    • HYDROcodone-acetaminophen (NORCO) 7.5-325 mg per tablet every 6 (six) hours   • loratadine (CLARITIN) 10 mg tablet Take 10 mg by mouth daily   • PARoxetine (PAXIL) 10 mg tablet Take 2 tablets (20 mg total) by mouth daily   • rosuvastatin (CRESTOR) 10 MG tablet Take 1 tablet (10 mg total) by mouth daily   • [DISCONTINUED] gabapentin (Neurontin) 100 mg capsule Take 1 capsule (100 mg total) by mouth 3 (three) times a day (Patient not taking: Reported on 3/20/2023)     Allergies   Allergen Reactions   • Ciprofloxacin      Other reaction(s): headache,dizziness, chest pain   • Acetaminophen      Pt states she currently takes without issue   • Fentanyl    • Propoxyphene      Immunization History   Administered Date(s) Administered   • INFLUENZA 12/07/2018, 10/31/2020   • Influenza, high dose seasonal 0.7 mL 12/07/2018   • Influenza, seasonal, injectable 10/12/2010   • Pneumococcal Polysaccharide PPV23 02/12/1999, 10/30/2008   • Tdap 06/28/2019       Objective     /70 (BP Location: Left arm, Patient Position: Sitting, Cuff Size: Large)   Pulse 88   Temp 99.6 °F (37.6 °C) (Tympanic)   Resp 16   Ht 5' 2" (1.575 m)   Wt 96.9 kg (213 lb 11.2 oz) SpO2 95%   BMI 39.09 kg/m²     Physical Exam  Vitals and nursing note reviewed. Constitutional:       General: She is not in acute distress. Appearance: Normal appearance. She is well-developed. She is not ill-appearing, toxic-appearing or diaphoretic. HENT:      Head: Normocephalic and atraumatic. Right Ear: Tympanic membrane, ear canal and external ear normal.      Left Ear: Tympanic membrane, ear canal and external ear normal.      Nose: Nose normal.      Mouth/Throat:      Mouth: Mucous membranes are moist.      Pharynx: Oropharynx is clear. Eyes:      Extraocular Movements: Extraocular movements intact. Conjunctiva/sclera: Conjunctivae normal.      Pupils: Pupils are equal, round, and reactive to light. Cardiovascular:      Rate and Rhythm: Normal rate and regular rhythm. Pulses: Normal pulses. Heart sounds: Murmur heard. Systolic murmur is present. No friction rub. No gallop. Pulmonary:      Effort: Pulmonary effort is normal. No respiratory distress. Breath sounds: Normal breath sounds. No wheezing, rhonchi or rales. Abdominal:      General: Abdomen is flat. Bowel sounds are normal.      Palpations: Abdomen is soft. Musculoskeletal:      Right lower leg: No edema. Left lower leg: No edema. Skin:     General: Skin is warm and dry. Capillary Refill: Capillary refill takes less than 2 seconds. Neurological:      General: No focal deficit present. Mental Status: She is alert and oriented to person, place, and time. Psychiatric:         Mood and Affect: Mood normal.         Behavior: Behavior normal. Behavior is cooperative.        Reece Acuña MD

## 2023-07-17 NOTE — ASSESSMENT & PLAN NOTE
Lab Results   Component Value Date    EGFR 51 07/08/2023    EGFR 58 10/12/2021    EGFR 49 08/23/2021    CREATININE 1.02 07/08/2023    CREATININE 0.94 10/12/2021    CREATININE 1.08 08/23/2021     Cre at baseline  GFR 51  Repeat CMP in 6 months

## 2023-07-17 NOTE — ASSESSMENT & PLAN NOTE
Systolic murmur heard on exam  2D ECHO ordered  Pt asymptomatic at this time - denies any chest pain, SOB, DOWD, fatigue, palpitations, syncope none

## 2023-07-17 NOTE — PROGRESS NOTES
Assessment and Plan:     Problem List Items Addressed This Visit        Respiratory    Allergic rhinitis     Continue loratadine 10mg daily for seasonal allergies             Genitourinary    Stage 3a chronic kidney disease ([address]) - Primary     Lab Results   Component Value Date    EGFR 51 07/08/2023    EGFR 58 10/12/2021    EGFR 49 08/23/2021    CREATININE 1.02 07/08/2023    CREATININE 0.94 10/12/2021    CREATININE 1.08 08/23/2021     Cre at baseline  GFR 51  Repeat CMP in 6 months             Other    Depression     Continue paxil 10mg BID   Well controlled          Hyperlipidemia     Continue crestor 10mg daily for HLD  Repeat CBC, CMP, fasting lipid panel in 6 months          Medicare annual wellness visit, subsequent     It was discussed about immunizations, diet, exercise and safety measures. Hyperglycemia     Repeat A1C and CMP in 6 months  tirzepatide 2.5mg injection ordered for hyperglycemia          Relevant Medications    tirzepatide 2.5 MG/0.5ML    Heart murmur     Systolic murmur heard on exam  2D ECHO ordered  Pt asymptomatic at this time - denies any chest pain, SOB, DOWD, fatigue, palpitations, syncope          Relevant Orders    Echo complete w/ contrast if indicated     BMI Counseling: Body mass index is 39.09 kg/m². The BMI is above normal. Nutrition recommendations include decreasing portion sizes, encouraging healthy choices of fruits and vegetables and decreasing fast food intake. Rationale for BMI follow-up plan is due to patient being overweight or obese. Preventive health issues were discussed with patient, and age appropriate screening tests were ordered as noted in patient's After Visit Summary. Personalized health advice and appropriate referrals for health education or preventive services given if needed, as noted in patient's After Visit Summary.      History of Present Illness:     Patient presents for a Medicare Wellness Visit    HPI   Patient Care Team:  Alicia Feliz, MD as PCP - General (Family Medicine)  Christiano Sorensen MD as PCP - PCP-[facility] ([facility])  Shahla Dueñas MD     Review of Systems:     Review of Systems     Problem List:     Patient Active Problem List   Diagnosis   • Allergic rhinitis   • Anxiety state   • Low back pain   • Cervical polyp   • Chronic pain disorder   • Depression   • Osteoarthritis of knee   • Gastroesophageal reflux disease   • Hemorrhoids   • Hiatal hernia   • Hyperlipidemia   • Degeneration of lumbosacral intervertebral disc   • Severe obesity (BMI 35.0-39. 9) with comorbidity ([address])   • Osteopenia   • Sjogren's syndrome ([address])   • Sleep apnea   • Tachycardia   • Urge incontinence   • Uterovaginal prolapse, incomplete   • Immunization due   • Vitamin D insufficiency   • Medicare annual wellness visit, subsequent   • Cataract   • Current drug use   • Kidney stone   • Spinal stenosis of lumbar region   • BMI 38.0-38.9,adult   • Memory deficit   • Exposure to COVID-19 virus   • Viral infection, unspecified   • Body mass index (BMI)40.0-44.9, adult   • Morbid obesity (HCC)   • Chronic pain of both knees   • Injury of right shoulder   • Flank pain   • Chronic bilateral back pain   • Stage 3a chronic kidney disease (HCC)   • Hyperglycemia   • Decreased GFR   • Notalgia   • Heart murmur      Past Medical and Surgical History:     Past Medical History:   Diagnosis Date   • Anxiety    • Depression    • GERD (gastroesophageal reflux disease)    • Hyperlipidemia    • Kidney stone    • Vitamin D deficiency      Past Surgical History:   Procedure Laterality Date   • CATARACT EXTRACTION     • CYSTOTOMY      with direct removal of calculus   • HEMORROIDECTOMY  2010   • KIDNEY STONE SURGERY      requiring open removal on the left side   • LITHOTRIPSY     • POLYPECTOMY     • REPLACEMENT TOTAL KNEE  2011   • SINUS SURGERY     • TONSILLECTOMY AND ADENOIDECTOMY        Family History:     Family History   Problem Relation Age of Onset   • Heart attack Mother • Diverticulosis Father    • Other Brother         back surgery and one brain surgery   • Asthma Daughter       Social History:     Social History     Socioeconomic History   • Marital status: /Civil Union     Spouse name: None   • Number of children: None   • Years of education: None   • Highest education level: None   Occupational History   • None   Tobacco Use   • Smoking status: Former     Types: Cigarettes     Quit date:      Years since quittin.5   • Smokeless tobacco: Never   • Tobacco comments:     no passive smoke exposure   Vaping Use   • Vaping Use: Never used   Substance and Sexual Activity   • Alcohol use: No   • Drug use: No   • Sexual activity: None   Other Topics Concern   • None   Social History Narrative   • None     Social Determinants of Health     Financial Resource Strain: Not on file   Food Insecurity: Not on file   Transportation Needs: Not on file   Physical Activity: Not on file   Stress: Not on file   Social Connections: Not on file   Intimate Partner Violence: Not on file   Housing Stability: Not on file      Medications and Allergies:     Current Outpatient Medications   Medication Sig Dispense Refill   • ALPRAZolam (XANAX) 0.25 mg tablet TAKE 1 TABLET BY MOUTH TWICE  DAILY 60 tablet 0   • Cholecalciferol (VITAMIN D3) 5000 units CAPS Take by mouth      • HYDROcodone-acetaminophen (NORCO) 7.5-325 mg per tablet every 6 (six) hours     • loratadine (CLARITIN) 10 mg tablet Take 10 mg by mouth daily     • PARoxetine (PAXIL) 10 mg tablet Take 2 tablets (20 mg total) by mouth daily 180 tablet 1   • rosuvastatin (CRESTOR) 10 MG tablet Take 1 tablet (10 mg total) by mouth daily 90 tablet 1   • tirzepatide 2.5 MG/0.5ML Inject 0.5 mL (2.5 mg total) under the skin every 7 days 2 mL 0     No current facility-administered medications for this visit.      Allergies   Allergen Reactions   • Ciprofloxacin      Other reaction(s): headache,dizziness, chest pain   • Acetaminophen      Pt states she currently takes without issue   • Fentanyl    • Propoxyphene       Immunizations:     Immunization History   Administered Date(s) Administered   • INFLUENZA 12/07/2018, 10/31/2020   • Influenza, high dose seasonal 0.7 mL 12/07/2018   • Influenza, seasonal, injectable 10/12/2010   • Pneumococcal Polysaccharide PPV23 02/12/1999, 10/30/2008   • Tdap 06/28/2019      Health Maintenance: There are no preventive care reminders to display for this patient. Topic Date Due   • COVID-19 Vaccine (1) Never done   • Pneumococcal Vaccine: 65+ Years (2 - PCV) 10/30/2009   • Influenza Vaccine (1) 09/01/2023      Medicare Screening Tests and Risk Assessments:     Willis Yoder is here for her Subsequent Wellness visit. Health Risk Assessment:   Patient feels that their physical health rating is same. Patient is satisfied with their life. Eyesight was rated as same. Hearing was rated as same. Patient feels that their emotional and mental health rating is slightly better. Patients states they are never, rarely angry. Patient states they are often unusually tired/fatigued. Pain experienced in the last 7 days has been a lot. Patient's pain rating has been 6/10. Patient states that she has experienced no weight loss or gain in last 6 months. Fall Risk Screening: In the past year, patient has experienced: history of falling in past year    Number of falls: 1  Injured during fall?: No    Feels unsteady when standing or walking?: Yes    Worried about falling?: No      Urinary Incontinence Screening:   Patient has not leaked urine accidently in the last six months. Home Safety:  Patient has trouble with stairs inside or outside of their home. Patient has working smoke alarms and has working carbon monoxide detector. Home safety hazards include: none. Nutrition:   Current diet is Regular. Medications:   Patient is currently taking over-the-counter supplements.  OTC medications include: see medication list. Activities of Daily Living (ADLs)/Instrumental Activities of Daily Living (IADLs):   Walk and transfer into and out of bed and chair?: Yes  Dress and groom yourself?: Yes    Bathe or shower yourself?: Yes    Feed yourself? Yes  Do your laundry/housekeeping?: Yes  Manage your money, pay your bills and track your expenses?: Yes  Make your own meals?: Yes    Do your own shopping?: No    Previous Hospitalizations:   Any hospitalizations or ED visits within the last 12 months?: No      Advance Care Planning:   Living will: Yes    Durable POA for healthcare: Yes    Advanced directive: Yes      Cognitive Screening:   Provider or family/friend/caregiver concerned regarding cognition?: No    PREVENTIVE SCREENINGS      Cardiovascular Screening:    General: Screening Not Indicated and History Lipid Disorder      Diabetes Screening:     General: Screening Current      Colorectal Cancer Screening:     General: Screening Not Indicated      Breast Cancer Screening:     General: Screening Not Indicated      Cervical Cancer Screening:    General: Screening Not Indicated      Osteoporosis Screening:    General: Risks and Benefits Discussed      Abdominal Aortic Aneurysm (AAA) Screening:        General: Screening Not Indicated      Lung Cancer Screening:     General: Screening Not Indicated      Hepatitis C Screening:    General: Risks and Benefits Discussed    Screening, Brief Intervention, and Referral to Treatment (SBIRT)      Brief Intervention  Alcohol & drug use screenings were reviewed. No concerns regarding substance use disorder identified. Review of Current Opioid Use    Opioid Risk Tool (ORT) Interpretation: Complete Opioid Risk Tool (ORT)    Other Counseling Topics:   Regular weightbearing exercise and calcium and vitamin D intake. No results found.      Physical Exam:     /70 (BP Location: Left arm, Patient Position: Sitting, Cuff Size: Large)   Pulse 88   Temp 99.6 °F (37.6 °C) (Tympanic)   Resp 16 Ht 5' 2" (1.575 m)   Wt 96.9 kg (213 lb 11.2 oz)   SpO2 95%   BMI 39.09 kg/m²     Physical Exam     Huan Carreno MD

## 2023-08-22 ENCOUNTER — HOSPITAL ENCOUNTER (OUTPATIENT)
Dept: NON INVASIVE DIAGNOSTICS | Facility: CLINIC | Age: 83
Discharge: HOME | End: 2023-08-22
Payer: COMMERCIAL

## 2023-08-22 VITALS
HEART RATE: 70 BPM | HEIGHT: 62 IN | SYSTOLIC BLOOD PRESSURE: 124 MMHG | BODY MASS INDEX: 39.2 KG/M2 | WEIGHT: 213 LBS | DIASTOLIC BLOOD PRESSURE: 70 MMHG

## 2023-08-22 DIAGNOSIS — R01.1 HEART MURMUR: ICD-10-CM

## 2023-08-22 LAB
AORTIC ROOT: 2.5 CM
AORTIC VALVE MEAN VELOCITY: 16.6 M/S
APICAL FOUR CHAMBER EJECTION FRACTION: 57 %
AV AREA BY CONTINUOUS VTI: 1 CM2
AV AREA PEAK VELOCITY: 1.1 CM2
AV LVOT MEAN GRADIENT: 3 MMHG
AV LVOT PEAK GRADIENT: 5 MMHG
AV MEAN GRADIENT: 12 MMHG
AV PEAK GRADIENT: 19 MMHG
AV VALVE AREA: 1.04 CM2
AV VELOCITY RATIO: 0.53
DOP CALC AO PEAK VEL: 2.18 M/S
DOP CALC AO VTI: 51 CM
DOP CALC LVOT AREA: 2.01 CM2
DOP CALC LVOT CARDIAC INDEX: 1.76 L/MIN/M2
DOP CALC LVOT CARDIAC OUTPUT: 3.47 L/MIN
DOP CALC LVOT DIAMETER: 1.6 CM
DOP CALC LVOT PEAK VEL VTI: 26.32 CM
DOP CALC LVOT PEAK VEL: 1.15 M/S
DOP CALC LVOT STROKE INDEX: 26.4 ML/M2
DOP CALC LVOT STROKE VOLUME: 52.89 CM3
E WAVE DECELERATION TIME: 306 MS
FRACTIONAL SHORTENING: 32 % (ref 28–44)
INTERVENTRICULAR SEPTUM IN DIASTOLE (PARASTERNAL SHORT AXIS VIEW): 1.2 CM
INTERVENTRICULAR SEPTUM: 1.2 CM (ref 0.6–1.1)
LAAS-AP2: 20.4 CM2
LAAS-AP4: 13.9 CM2
LEFT ATRIUM SIZE: 3.3 CM
LEFT ATRIUM VOLUME (MOD BIPLANE): 51 ML
LEFT INTERNAL DIMENSION IN SYSTOLE: 2.6 CM (ref 2.1–4)
LEFT VENTRICULAR INTERNAL DIMENSION IN DIASTOLE: 3.8 CM (ref 3.5–6)
LEFT VENTRICULAR POSTERIOR WALL IN END DIASTOLE: 1.2 CM
LEFT VENTRICULAR STROKE VOLUME: 49 ML
LVSV (TEICH): 49 ML
MV E'TISSUE VEL-LAT: 7 CM/S
MV E'TISSUE VEL-SEP: 7 CM/S
MV PEAK A VEL: 0.94 M/S
MV PEAK E VEL: 75 CM/S
RIGHT ATRIUM AREA SYSTOLE A4C: 14.8 CM2
RIGHT VENTRICLE ID DIMENSION: 3.1 CM
SL CV LEFT ATRIUM LENGTH A2C: 5.6 CM
SL CV LV EF: 65
SL CV PED ECHO LEFT VENTRICLE DIASTOLIC VOLUME (MOD BIPLANE) 2D: 74 ML
SL CV PED ECHO LEFT VENTRICLE SYSTOLIC VOLUME (MOD BIPLANE) 2D: 25 ML
TR MAX PG: 20 MMHG
TR PEAK VELOCITY: 2.3 M/S
TRICUSPID ANNULAR PLANE SYSTOLIC EXCURSION: 1.8 CM
TRICUSPID VALVE PEAK REGURGITATION VELOCITY: 2.26 M/S

## 2023-08-22 PROCEDURE — 93306 TTE W/DOPPLER COMPLETE: CPT | Performed by: INTERNAL MEDICINE

## 2023-08-22 PROCEDURE — 93306 TTE W/DOPPLER COMPLETE: CPT

## 2023-09-15 PROBLEM — Z00.00 MEDICARE ANNUAL WELLNESS VISIT, SUBSEQUENT: Status: RESOLVED | Noted: 2018-12-07 | Resolved: 2023-09-15

## 2023-09-29 DIAGNOSIS — R73.9 HYPERGLYCEMIA: ICD-10-CM

## 2023-09-29 NOTE — TELEPHONE ENCOUNTER
Refills have been requested for the following medications:         tirzepatide 2.5 MG/0.5ML [Wassim Marily]     Preferred pharmacy: [address] [place] [address] seen 7/17/23  Medication sent to pharmacy

## 2023-10-02 ENCOUNTER — TELEPHONE (OUTPATIENT)
Dept: FAMILY MEDICINE CLINIC | Facility: CLINIC | Age: 83
End: 2023-10-02

## 2023-10-02 DIAGNOSIS — R93.1 ABNORMAL ECHOCARDIOGRAM: Primary | ICD-10-CM

## 2023-10-02 NOTE — TELEPHONE ENCOUNTER
----- Message from Lexie Lobstein. Hoenscheid sent at 9/29/2023  3:06 PM EDT -----  Regarding: Echo Results  Contact: [phone]  Hi Dr. Mariann Lozoya,  I forgot to ask you about my mom's Echo ultrasound that she had back in August.  I read the results but I'm not sure if there is anything that you find concerning. Can you let me know?   Thank you,  Jethro Rawls

## 2023-11-01 DIAGNOSIS — R73.9 HYPERGLYCEMIA: ICD-10-CM

## 2023-11-15 DIAGNOSIS — F32.89 OTHER DEPRESSION: ICD-10-CM

## 2023-11-15 DIAGNOSIS — E78.2 MIXED HYPERLIPIDEMIA: ICD-10-CM

## 2023-11-15 RX ORDER — ROSUVASTATIN CALCIUM 10 MG/1
10 TABLET, COATED ORAL DAILY
Qty: 90 TABLET | Refills: 3 | Status: SHIPPED | OUTPATIENT
Start: 2023-11-15

## 2023-11-15 RX ORDER — PAROXETINE 10 MG/1
20 TABLET, FILM COATED ORAL DAILY
Qty: 180 TABLET | Refills: 3 | Status: SHIPPED | OUTPATIENT
Start: 2023-11-15

## 2023-12-04 ENCOUNTER — CONSULT (OUTPATIENT)
Dept: CARDIOLOGY CLINIC | Facility: CLINIC | Age: 83
End: 2023-12-04
Payer: COMMERCIAL

## 2023-12-04 VITALS
HEART RATE: 79 BPM | DIASTOLIC BLOOD PRESSURE: 72 MMHG | BODY MASS INDEX: 36.8 KG/M2 | WEIGHT: 201.2 LBS | SYSTOLIC BLOOD PRESSURE: 115 MMHG

## 2023-12-04 DIAGNOSIS — R93.1 ABNORMAL ECHOCARDIOGRAM: ICD-10-CM

## 2023-12-04 DIAGNOSIS — I35.0 NONRHEUMATIC AORTIC VALVE STENOSIS: Primary | ICD-10-CM

## 2023-12-04 DIAGNOSIS — F41.1 ANXIETY STATE: ICD-10-CM

## 2023-12-04 DIAGNOSIS — E66.01 SEVERE OBESITY (BMI 35.0-39.9) WITH COMORBIDITY (HCC): ICD-10-CM

## 2023-12-04 DIAGNOSIS — N18.31 STAGE 3A CHRONIC KIDNEY DISEASE (HCC): ICD-10-CM

## 2023-12-04 PROCEDURE — 93000 ELECTROCARDIOGRAM COMPLETE: CPT

## 2023-12-04 PROCEDURE — 99204 OFFICE O/P NEW MOD 45 MIN: CPT

## 2023-12-04 NOTE — PROGRESS NOTES
Cardiology Consultation   MD Esteban Virgen MD, Tanisha Mahoney DO, LUKE Sosa MD Adelbert Mast, DO, Candy Garza DO, [facility]  -------------------------------------------------------------------  [facility]  [address] [facility] [address]  [place], [place] [address]  Phone: [phone]  Fax: [phone]  12/04/23  Forrest Fill Hoenscheid  YOB: 1940   MRN: [id number]      Referring Physician: Kerry Quinones MD  [address] [facility]     HPI:  I am seeing this patient in cardiology consultation for:  abnormal echo    Keo Barajas is a 82 y.o. female with:   Obesity  Chronic pain  GERD  CKD 3 calculated by eGFR 51    Tobacco: smoked in past but quit 25 years ago  Alcohol: none  Drugs: none  Fhx- mother with COPD emphysema, CAD, CVA    She presents today for evaluation with cardiology. Her PCP noticed a heart murmur when she was examined at her last visit, echocardiogram shows mild to moderate aortic stenosis. She states she is asymptomatic at this time, denies any heart failure type symptoms or valvular heart disease type symptoms. She denies any significant dyspnea with exertion chest pain chest pressure lower extremity swelling or syncope    Review of Systems   Constitutional:  Negative for chills and fever. HENT:  Negative for facial swelling and sore throat. Eyes:  Negative for visual disturbance. Respiratory:  Negative for cough, chest tightness, shortness of breath and wheezing. Cardiovascular:  Negative for chest pain, palpitations and leg swelling. Gastrointestinal:  Negative for abdominal pain, blood in stool, constipation, diarrhea, nausea and vomiting. Endocrine: Negative for cold intolerance and heat intolerance. Genitourinary:  Negative for decreased urine volume, difficulty urinating, dysuria and hematuria.    Musculoskeletal:  Positive for arthralgias, back pain and gait problem. Negative for myalgias. Skin:  Negative for rash. Neurological:  Negative for dizziness, syncope, weakness and numbness. Psychiatric/Behavioral:  Negative for agitation, behavioral problems and confusion. The patient is not nervous/anxious. OBJECTIVE  Vitals:    12/04/23 1434   BP: 115/72   Pulse: 79       Physical Exam   Constitutional: awake, alert and oriented, in no acute distress, no obvious deformities, obese female  Head: Normocephalic, without obvious abnormality, atraumatic  Eyes: conjunctivae clear and moist. Sclera anicteric. No xanthelasmas. Pupils equal bilaterally. Extraocular motions are full. Ear nose mouth and throat: ears are symmetrical bilaterally, hearing appears to be equal bilaterally, no nasal discharge or epistaxis, oropharynx is clear with moist mucous membranes  Neck: Trachea is midline, neck is supple, no thyromegaly or significant lymphadenopathy, there is full range of motion. Lungs: clear to auscultation bilaterally, no wheezes, no rales, no rhonchi, no accessory muscle use, breathing is nonlabored  Heart: regular rate and rhythm, S1, S2 normal, 2/6 systolic murmur, no click, no rub and no gallop, no lower extremity edema  Abdomen: Obese, soft, non-tender; bowel sounds normal; no masses, no organomegaly  Psychiatric: Patient is oriented to time, place, person, mood/affect is negative for depression, anxiety, agitation. She does appear to have some underlying cognitive dysfunction developing and she repeats herself quite frequently almost as if she does not remember which she said earlier in the conversation  Skin: Skin is warm, dry, intact. No obvious rashes or lesions on exposed extremities. Nail beds are pink with no cyanosis or clubbing.       EKG:  Results for orders placed or performed in visit on 12/04/23   POCT ECG    Impression    Sinus rhythm with poor anterior R wave progression        The ASCVD Risk score (Kristy DK, et al., 2019) failed to calculate for the following reasons: The 2019 ASCVD risk score is only valid for ages 36 to 78    IMPRESSION:  Obesity  Chronic pain  GERD  CKD 3 calculated by eGFR 51  Probable development of underlying age-related cognitive dysfunction    DISCUSSION/RECOMMENDATIONS:  She presents for evaluation today with cardiology. She has a murmur exam, echo shows mild to moderate aortic stenosis. Fortunately she is asymptomatic at this time  Her blood pressure is controlled  She has no prior known history of any type of underlying cardiac disease. She denies any angina type chest pain today. Her electrocardiogram has poor anterior R wave progression but is likely due to her body habitus, there is no findings of prior infarct. We will continue her current dose of Crestor, LDL is mildly elevated  Given the degree of her aortic stenosis, would plan to repeat echo in 2 years  Otherwise currently she is stable, would plan for follow-up 1 year to reevaluate for symptoms and then plan for new echo in August 2025 in the absence of new or developing symptoms    Josh Galarza DO, [place], [facility]  --------------------------------------------------------------------------------  TREADMILL STRESS  No results found for this or any previous visit.     ----------------------------------------------------------------------------------------------  NUCLEAR STRESS TEST: No results found for this or any previous visit. No results found for this or any previous visit.      --------------------------------------------------------------------------------  CATH:  No results found for this or any previous visit.    --------------------------------------------------------------------------------  ECHO:   No results found for this or any previous visit.     No results found for this or any previous visit.    --------------------------------------------------------------------------------  HOLTER  No results found for this or any previous visit.    --------------------------------------------------------------------------------  CAROTIDS  No results found for this or any previous visit. Diagnoses and all orders for this visit:    Nonrheumatic aortic valve stenosis    Abnormal echocardiogram  -     Ambulatory Referral to Cardiology  -     POCT ECG    Severe obesity (BMI 35.0-39. 9) with comorbidity ([address])    Anxiety state    Stage 3a chronic kidney disease ([address])       ======================================================    Past Medical History:   Diagnosis Date   • Anxiety    • Depression    • GERD (gastroesophageal reflux disease)    • Hyperlipidemia    • Kidney stone    • Vitamin D deficiency      Past Surgical History:   Procedure Laterality Date   • CATARACT EXTRACTION     • CYSTOTOMY      with direct removal of calculus   • HEMORROIDECTOMY  2010   • KIDNEY STONE SURGERY      requiring open removal on the left side   • LITHOTRIPSY     • POLYPECTOMY     • REPLACEMENT TOTAL KNEE  2011   • SINUS SURGERY     • TONSILLECTOMY AND ADENOIDECTOMY           Medications  Current Outpatient Medications   Medication Sig Dispense Refill   • ALPRAZolam (XANAX) 0.25 mg tablet TAKE 1 TABLET BY MOUTH TWICE  DAILY (Patient taking differently: Take 0.25 mg by mouth as needed) 60 tablet 0   • Cholecalciferol (VITAMIN D3) 5000 units CAPS Take by mouth      • HYDROcodone-acetaminophen (NORCO) 7.5-325 mg per tablet Take 1 tablet by mouth as needed     • loratadine (CLARITIN) 10 mg tablet Take 10 mg by mouth daily     • PARoxetine (PAXIL) 10 mg tablet TAKE 2 TABLETS BY MOUTH DAILY 180 tablet 3   • rosuvastatin (CRESTOR) 10 MG tablet TAKE 1 TABLET BY MOUTH DAILY 90 tablet 3   • tirzepatide 2.5 MG/0.5ML Inject 0.5 mL (2.5 mg total) under the skin every 7 days 2 mL 0     No current facility-administered medications for this visit.         Allergies   Allergen Reactions   • Ciprofloxacin      Other reaction(s): headache,dizziness, chest pain   • Acetaminophen      Pt states she currently takes without issue   • Fentanyl    • Propoxyphene        Social History     Socioeconomic History   • Marital status:       Spouse name: Not on file   • Number of children: Not on file   • Years of education: Not on file   • Highest education level: Not on file   Occupational History   • Not on file   Tobacco Use   • Smoking status: Former     Types: Cigarettes     Quit date: 20     Years since quittin.9   • Smokeless tobacco: Never   • Tobacco comments:     no passive smoke exposure   Vaping Use   • Vaping Use: Never used   Substance and Sexual Activity   • Alcohol use: No   • Drug use: No   • Sexual activity: Not on file   Other Topics Concern   • Not on file   Social History Narrative   • Not on file     Social Determinants of Health     Financial Resource Strain: Not on file   Food Insecurity: Not on file   Transportation Needs: Not on file   Physical Activity: Not on file   Stress: Not on file   Social Connections: Not on file   Intimate Partner Violence: Not on file   Housing Stability: Not on file        Family History   Problem Relation Age of Onset   • Heart attack Mother    • Diverticulosis Father    • Other Brother         back surgery and one brain surgery   • Asthma Daughter        Lab Results   Component Value Date    WBC 6.02 2023    HGB 14.2 2023    HCT 45.6 2023    MCV 88 2023     2023      Lab Results   Component Value Date    SODIUM 140 2023    K 4.2 2023     (H) 2023    CO2 25 2023    BUN 13 2023    CREATININE 1.02 2023    CALCIUM 9.6 2023      Lab Results   Component Value Date    HGBA1C 5.4 2023      Lab Results   Component Value Date    CHOL 178 2018     Lab Results   Component Value Date    HDL 70 2023    HDL 64 2021    HDL 76 2019     Lab Results   Component Value Date    LDLCALC 104 (H) 2023    LDLCALC 107 (H) 2021    LDLCALC 78 12/20/2019     Lab Results   Component Value Date    TRIG 144 07/08/2023    TRIG 130 08/23/2021    TRIG 112 12/20/2019     No results found for: "CHOLHDL"   No results found for: "INR", "PROTIME"       Patient Active Problem List    Diagnosis Date Noted   • Heart murmur 07/17/2023   • Notalgia 04/29/2022   • Stage 3a chronic kidney disease ([address]) 12/17/2021   • Hyperglycemia 12/17/2021   • Decreased GFR 12/17/2021   • Flank pain 08/16/2021   • Chronic bilateral back pain 08/16/2021   • Injury of right shoulder 08/10/2021   • Chronic pain of both knees 07/09/2021   • Morbid obesity ([address]) 06/18/2021   • Body mass index (BMI)40.0-44.9, adult 06/17/2021   • Exposure to COVID-19 virus 12/29/2020   • Viral infection, unspecified 12/29/2020   • BMI 38.0-38.9,adult 06/28/2019   • Memory deficit 06/28/2019   • Allergic rhinitis 12/07/2018   • Immunization due 12/07/2018   • Vitamin D insufficiency 12/07/2018   • Current drug use 09/12/2017   • Kidney stone 01/18/2016   • Chronic pain disorder 08/03/2015   • Degeneration of lumbosacral intervertebral disc 04/28/2015   • Spinal stenosis of lumbar region 04/28/2015   • Tachycardia 03/13/2012   • Low back pain 03/25/2011   • Severe obesity (BMI 35.0-39. 9) with comorbidity ([address]) 02/23/2011   • Sjogren's syndrome ([address]) 02/23/2011   • Hyperlipidemia 11/15/2010   • Urge incontinence 08/13/2009   • Uterovaginal prolapse, incomplete 08/13/2009   • Cervical polyp 06/16/2008   • Hemorrhoids 06/16/2008   • Osteoarthritis of knee 07/09/2004   • Gastroesophageal reflux disease 07/09/2004   • Osteopenia 07/09/2004   • Cataract 07/09/2004   • Sleep apnea 08/12/2000   • Hiatal hernia 03/12/1999   • Anxiety state 03/12/1998   • Depression 03/12/1998       Portions of the record may have been created with voice recognition software. Occasional wrong word or "sound a like" substitutions may have occurred due to the inherent limitations of voice recognition software.  Read the chart carefully and recognize, using context, where substitutions have occurred.     Karissa Robbins DO, [facility]  12/4/2023 3:21 PM

## 2023-12-20 DIAGNOSIS — F41.9 ANXIETY: ICD-10-CM

## 2023-12-20 DIAGNOSIS — R73.9 HYPERGLYCEMIA: ICD-10-CM

## 2023-12-20 NOTE — TELEPHONE ENCOUNTER
Refills have been requested for the following medications:         ALPRAZolam (XANAX) 0.25 mg tablet [Wassim Harshamrdana]         tirzepatide 2.5 MG/0.5ML [Wassim Abosamra]     Preferred pharmacy: [facility] [address]    Last seen 7/17/23  Has appt 1/15/24     1 BARBARA HOENSCHEID 1940 F [address] [place]      Search Criteria  NAME DATE OF BIRTH DATE RANGE  Shira Young 1940 12- To 14-  REQUESTER NAME REQUESTED DATE  WASSIM ABOSAMRA Wed Dec 20 2023 16:26:54 GMT-0500 (Eastern Standard Time)  Summary  Prescriptions  6  Prescribers  2  Pharmacies  2  Drug Classes  Benzodiazepines  2  Stimulants  0  Opioids  4  Muscle Relaxants  0  Opioid Dosage  Total MME for Active Prescriptions  0    Average MME  10.00         Prescriptions  Notifications    Prescribers  Pharmacies  MME Graph    Show All     [place]   Drug Categories:      Opioids       Benzodiazepines     Show  10  entries  Search:  PATIENT ID PRESCRIPTION # FILLED WRITTEN DRUG LABEL QTY DAYS STRENGTH MME** PRESCRIBER PHARMACY PAYMENT REFILL #/AUTH STATE DETAIL  1 [id number] 11/20/2023 11/20/2023 ACETAMINOPHEN 325 MG / HYDROcodone BITARTRATE 5 MG ORAL TABLET (Tablet) 60.0 30 5.0 MG/325.0 MG 10.0 [id number] Commercial Insurance 0 / 0 [place]   1 [id number] 08/21/2023 08/21/2023 ACETAMINOPHEN 325 MG / HYDROcodone BITARTRATE 5 MG ORAL TABLET (Tablet) 60.0 30 5.0 MG/325.0 MG 10.0 [id number] Commercial Insurance 0 / 0 [place]   1 [id number] 05/24/2023 03/14/2023 ALPRAZolam (Tablet) 60.0 30 0.25 MG NA ANDREA) [facility] [employer] Medicare 0 / 0 [place]   1 [id number] 04/04/2023 04/04/2023 ACETAMINOPHEN 325 MG / HYDROcodone BITARTRATE 5 MG ORAL TABLET (Tablet) 60.0 30 5.0 MG/325.0 MG 10.0 [id number] Medicare 0 / 0 [place]   1 [id number] 12/29/2022 12/27/2022 ALPRAZolam (Tablet) 60.0 30 0.25 MG NA ANDREA) ABOSAMRA [facility] Medicare 0 /  0 [place]   1 [id number] 12/27/2022 12/27/2022 ACETAMINOPHEN 325 MG / HYDROcodone BITARTRATE 5 MG ORAL TABLET (Tablet) 60.0 30 5.0 MG/32

## 2023-12-21 RX ORDER — ALPRAZOLAM 0.25 MG/1
0.25 TABLET ORAL 2 TIMES DAILY
Qty: 60 TABLET | Refills: 0 | Status: SHIPPED | OUTPATIENT
Start: 2023-12-21

## 2024-01-09 DIAGNOSIS — R73.9 HYPERGLYCEMIA: ICD-10-CM

## 2024-01-10 RX ORDER — TIRZEPATIDE 2.5 MG/.5ML
INJECTION, SOLUTION SUBCUTANEOUS
Qty: 2 ML | Refills: 0 | Status: SHIPPED | OUTPATIENT
Start: 2024-01-10

## 2024-01-23 ENCOUNTER — TELEPHONE (OUTPATIENT)
Dept: FAMILY MEDICINE CLINIC | Facility: CLINIC | Age: 84
End: 2024-01-23

## 2024-01-23 NOTE — TELEPHONE ENCOUNTER
PA for  Mounjaro 2.5 MG/0.5ML  Denied    Reason:              Message sent to provider pool Yes    Denial letter scanned into Media Yes    Appeal started No

## 2024-01-23 NOTE — TELEPHONE ENCOUNTER
PA for Mounjaro 2.5 MG/0.5ML     Submitted via  []CMM-KEY   [x][employer]-Case ID # [id number]   []Faxed to plan   []Other website   []Phone call Case ID #     Office notes sent, clinical questions answered. Awaiting determination

## 2024-01-26 ENCOUNTER — OFFICE VISIT (OUTPATIENT)
Dept: FAMILY MEDICINE CLINIC | Facility: CLINIC | Age: 84
End: 2024-01-26
Payer: COMMERCIAL

## 2024-01-26 VITALS
RESPIRATION RATE: 16 BRPM | TEMPERATURE: 98.6 F | DIASTOLIC BLOOD PRESSURE: 72 MMHG | BODY MASS INDEX: 37.36 KG/M2 | HEIGHT: 62 IN | SYSTOLIC BLOOD PRESSURE: 128 MMHG | HEART RATE: 88 BPM | OXYGEN SATURATION: 97 % | WEIGHT: 203 LBS

## 2024-01-26 DIAGNOSIS — E66.01 SEVERE OBESITY (BMI 35.0-39.9) WITH COMORBIDITY (HCC): Primary | ICD-10-CM

## 2024-01-26 DIAGNOSIS — R73.9 HYPERGLYCEMIA: ICD-10-CM

## 2024-01-26 DIAGNOSIS — I35.0 AORTIC VALVE STENOSIS, ETIOLOGY OF CARDIAC VALVE DISEASE UNSPECIFIED: ICD-10-CM

## 2024-01-26 DIAGNOSIS — M35.00 SJOGREN'S SYNDROME, WITH UNSPECIFIED ORGAN INVOLVEMENT (HCC): ICD-10-CM

## 2024-01-26 DIAGNOSIS — F32.89 OTHER DEPRESSION: ICD-10-CM

## 2024-01-26 DIAGNOSIS — E78.5 HYPERLIPIDEMIA, UNSPECIFIED HYPERLIPIDEMIA TYPE: ICD-10-CM

## 2024-01-26 DIAGNOSIS — N18.31 STAGE 3A CHRONIC KIDNEY DISEASE (HCC): ICD-10-CM

## 2024-01-26 PROCEDURE — 99214 OFFICE O/P EST MOD 30 MIN: CPT | Performed by: FAMILY MEDICINE

## 2024-01-26 RX ORDER — TIRZEPATIDE 5 MG/.5ML
5 INJECTION, SOLUTION SUBCUTANEOUS WEEKLY
Qty: 2 ML | Refills: 0 | Status: SHIPPED | OUTPATIENT
Start: 2024-01-26 | End: 2024-02-23

## 2024-01-26 NOTE — ASSESSMENT & PLAN NOTE
Patient has lost 10 pounds since previous visit and was tolerating Mounjoro well. Patient is unable to get Mounjoro again by her insurance. Sent prescription for Zepbound. Follow-up in 6 months. Come back in 3 months if able to obtain Zepbound.

## 2024-01-26 NOTE — PROGRESS NOTES
Name: Barbara J Hoenscheid      : 1940      MRN: [id number]  Encounter Provider: Shreya Calixto MD  Encounter Date: 2024   Encounter department: [facility]  PRIMARY CARE    Assessment & Plan     1. Severe obesity (BMI 35.0-39.9) with comorbidity (HCC)  Assessment & Plan:  Patient has lost 10 pounds since previous visit and was tolerating Mounjoro well. Patient is unable to get Mounjoro again by her insurance. Sent prescription for Zepbound. Follow-up in 6 months. Come back in 3 months if able to obtain Zepbound.    Orders:  -     tirzepatide (Zepbound) 5 mg/0.5 mL auto-injector; Inject 0.5 mL (5 mg total) under the skin once a week for 28 days    2. Hyperlipidemia, unspecified hyperlipidemia type  Assessment & Plan:  Controlled on rosuvastatin. Will check updated lipid panel. Follow-up in 6 months.    Orders:  -     CBC and differential; Future  -     Comprehensive metabolic panel; Future  -     TSH, 3rd generation with Free T4 reflex; Future  -     Lipid Panel with Direct LDL reflex; Future    3. Other depression  Assessment & Plan:  Stable on Paxil daily. Will continue to monitor.      4. Stage 3a chronic kidney disease (HCC)  Assessment & Plan:  Lab Results   Component Value Date    EGFR 51 2023    EGFR 58 10/12/2021    EGFR 49 2021    CREATININE 1.02 2023    CREATININE 0.94 10/12/2021    CREATININE 1.08 2021     Orders:  -     CBC and differential; Future  -     Comprehensive metabolic panel; Future    5. Sjogren's syndrome, with unspecified organ involvement (HCC)  Assessment & Plan:  Asymptomatic, will continue to monitor.      6. Hyperglycemia  Assessment & Plan:  History of hyperglycemia in previous labs. Will recheck HgbA1c and continue to monitor.    Orders:  -     Hemoglobin A1C; Future    7. Aortic valve stenosis, etiology of cardiac valve disease unspecified  Assessment & Plan:  Stable, asymptomatic. Following with cardiology.             Subjective      Shira is an 83 year old female with a past medical history of hyperlipidemia, CKD, depression presenting to the office with her daughter for a wellness visit. Today she is feeling well and has no acute complaints. She has lost 10 pounds since her previous well-visit in August. She has been tolerating Mounjoro well and has not experienced any intolerable side effects. She has 2 doses of Monujoro left and is unable to get another refill due to insurance. Her blood pressure is well-controlled today at 128/72.     Hyperlipidemia  Pertinent negatives include no chest pain, myalgias or shortness of breath.     Review of Systems   Constitutional:  Negative for chills and fever.   HENT:  Negative for congestion and sore throat.    Eyes:  Negative for pain and visual disturbance.   Respiratory:  Negative for cough, shortness of breath and wheezing.    Cardiovascular:  Negative for chest pain and palpitations.   Gastrointestinal:  Negative for abdominal pain, nausea and vomiting.   Genitourinary:  Negative for difficulty urinating and dysuria.   Musculoskeletal:  Negative for arthralgias and myalgias.   Skin:  Negative for color change and rash.   Neurological:  Negative for facial asymmetry and headaches.   All other systems reviewed and are negative.      Past Medical History:   Diagnosis Date   • Anxiety    • Depression    • GERD (gastroesophageal reflux disease)    • Hyperlipidemia    • Kidney stone    • Vitamin D deficiency      Past Surgical History:   Procedure Laterality Date   • CATARACT EXTRACTION     • CYSTOTOMY      with direct removal of calculus   • HEMORROIDECTOMY  2010   • KIDNEY STONE SURGERY      requiring open removal on the left side   • LITHOTRIPSY     • POLYPECTOMY     • REPLACEMENT TOTAL KNEE  2011   • SINUS SURGERY     • TONSILLECTOMY AND ADENOIDECTOMY       Family History   Problem Relation Age of Onset   • Heart attack Mother    • Diverticulosis Father    • Other Brother         back surgery  and one brain surgery   • Asthma Daughter      Social History     Socioeconomic History   • Marital status:      Spouse name: None   • Number of children: None   • Years of education: None   • Highest education level: None   Occupational History   • None   Tobacco Use   • Smoking status: Former     Current packs/day: 0.00     Types: Cigarettes     Quit date:      Years since quittin.0   • Smokeless tobacco: Never   • Tobacco comments:     no passive smoke exposure   Vaping Use   • Vaping status: Never Used   Substance and Sexual Activity   • Alcohol use: No   • Drug use: No   • Sexual activity: None   Other Topics Concern   • None   Social History Narrative   • None     Social Determinants of Health     Financial Resource Strain: Not on file   Food Insecurity: Not on file   Transportation Needs: Not on file   Physical Activity: Not on file   Stress: Not on file   Social Connections: Not on file   Intimate Partner Violence: Not on file   Housing Stability: Not on file     Current Outpatient Medications on File Prior to Visit   Medication Sig   • ALPRAZolam (XANAX) 0.25 mg tablet Take 1 tablet (0.25 mg total) by mouth 2 (two) times a day   • Cholecalciferol (VITAMIN D3) 5000 units CAPS Take by mouth    • HYDROcodone-acetaminophen (NORCO) 7.5-325 mg per tablet Take 1 tablet by mouth as needed   • loratadine (CLARITIN) 10 mg tablet Take 10 mg by mouth daily   • Mounjaro 2.5 MG/0.5ML INJECT THE CONTENTS OF ONE PEN  SUBCUTANEOUSLY WEEKLY AS  DIRECTED   • PARoxetine (PAXIL) 10 mg tablet TAKE 2 TABLETS BY MOUTH DAILY   • rosuvastatin (CRESTOR) 10 MG tablet TAKE 1 TABLET BY MOUTH DAILY     Allergies   Allergen Reactions   • Ciprofloxacin      Other reaction(s): headache,dizziness, chest pain   • Acetaminophen      Pt states she currently takes without issue   • Fentanyl    • Propoxyphene      Immunization History   Administered Date(s) Administered   • INFLUENZA 2018, 10/31/2020   • Influenza, high dose  "seasonal 0.7 mL 12/07/2018   • Influenza, seasonal, injectable 10/12/2010   • Pneumococcal Polysaccharide PPV23 02/12/1999, 10/30/2008   • Tdap 06/28/2019       Objective     /72 (BP Location: Left arm, Patient Position: Sitting, Cuff Size: Large)   Pulse 88   Temp 98.6 °F (37 °C) (Tympanic)   Resp 16   Ht 5' 2\" (1.575 m)   Wt 92.1 kg (203 lb)   SpO2 97%   BMI 37.13 kg/m²     Physical Exam  Vitals and nursing note reviewed.   Constitutional:       General: She is not in acute distress.     Appearance: Normal appearance.   HENT:      Head: Normocephalic and atraumatic.      Nose: Nose normal.   Eyes:      Extraocular Movements: Extraocular movements intact.   Cardiovascular:      Rate and Rhythm: Normal rate and regular rhythm.      Pulses: Normal pulses.      Heart sounds: Murmur (systolic murmur) heard.      No friction rub. No gallop.   Pulmonary:      Effort: Pulmonary effort is normal. No respiratory distress.      Breath sounds: Normal breath sounds. No wheezing, rhonchi or rales.   Musculoskeletal:         General: Normal range of motion.      Cervical back: Normal range of motion.      Right lower leg: No edema.      Left lower leg: No edema.   Skin:     General: Skin is warm and dry.      Capillary Refill: Capillary refill takes less than 2 seconds.      Coloration: Skin is not jaundiced or pale.      Findings: No erythema.   Neurological:      Mental Status: She is alert.       Shreya Calixto MD  BMI Counseling: Body mass index is 37.13 kg/m². The BMI is above normal. Nutrition recommendations include reducing portion sizes, decreasing overall calorie intake, and 3-5 servings of fruits/vegetables daily. Exercise recommendations include moderate aerobic physical activity for 150 minutes/week.  "

## 2024-01-26 NOTE — ASSESSMENT & PLAN NOTE
Lab Results   Component Value Date    EGFR 51 07/08/2023    EGFR 58 10/12/2021    EGFR 49 08/23/2021    CREATININE 1.02 07/08/2023    CREATININE 0.94 10/12/2021    CREATININE 1.08 08/23/2021

## 2024-01-29 ENCOUNTER — TELEPHONE (OUTPATIENT)
Dept: FAMILY MEDICINE CLINIC | Facility: CLINIC | Age: 84
End: 2024-01-29

## 2024-01-29 NOTE — TELEPHONE ENCOUNTER
PA for tirzepatide (Zepbound) 5 mg/0.5 mL auto-injector     Submitted via  []CMM-KEY   [x][employer]-Case ID # [id number]  []Faxed to plan   []Other website   []Phone call Case ID #     Office notes sent, clinical questions answered. Awaiting determination

## 2024-01-29 NOTE — TELEPHONE ENCOUNTER
Please see below medicare will not cover this medication. Drugs used for weight loss are not covered.

## 2024-01-29 NOTE — TELEPHONE ENCOUNTER
PA for tirzepatide (Zepbound) 5 mg/0.5 mL auto-injector Denied    Reason:              Message sent to provider pool Yes    Denial letter scanned into Media Yes    Appeal started No

## 2024-03-13 ENCOUNTER — PATIENT MESSAGE (OUTPATIENT)
Dept: FAMILY MEDICINE CLINIC | Facility: CLINIC | Age: 84
End: 2024-03-13

## 2024-03-13 ENCOUNTER — TELEPHONE (OUTPATIENT)
Dept: FAMILY MEDICINE CLINIC | Facility: CLINIC | Age: 84
End: 2024-03-13

## 2024-03-13 NOTE — TELEPHONE ENCOUNTER
----- Message from Lani Cardenas sent at 3/13/2024  1:42 PM EDT -----  Regarding: FW: Wegovy  Contact: [phone]    ----- Message -----  From: Hoenscheid, Barbara J  Sent: 3/13/2024   1:40 PM EDT  To: Primary Care [facility]  Subject: Nay Calixto,  This is Shira Blakely's daughter.  NAY received a new indication last week   . . . indicated in combination with a reduced calorie diet and increased physical activity:  • to reduce the risk of major adverse cardiovascular events  (cardiovascular death, non-fatal myocardial infarction, or non-fatal stroke) in adults with established cardiovascular disease and either obesity or overweight    I believe this is something the insurance should approve.  Do you agree?   Could you submit a prescription for my mom and we can see if they approve it?    Have a great day!  Zora

## 2024-03-18 ENCOUNTER — TELEPHONE (OUTPATIENT)
Dept: FAMILY MEDICINE CLINIC | Facility: CLINIC | Age: 84
End: 2024-03-18

## 2024-03-18 NOTE — TELEPHONE ENCOUNTER
Can you send a rx for wegovy as pt daughter believes it would be covered by medicare. The zepound was denied in January but pt daughter said guidelines changed.    Please advise

## 2024-03-18 NOTE — TELEPHONE ENCOUNTER
----- Message from Lani Cardenas sent at 3/18/2024  4:35 PM EDT -----  Regarding: FW: Wegovy  Contact: [phone]    ----- Message -----  From: Hoenscheid, Barbara J  Sent: 3/18/2024   4:30 PM EDT  To: Primary Care [facility]  Subject: Wegovy                                           Hi,   Bernarda called me last week to let me know that the insurance said they do not cover weight loss drugs so I called them because of the new indication.  The woman I spoke to said that it should be covered and I told them it was already rejected.  She said there was no prescription listed for Wegovy in her file.   Can you resend one?    Thanks,  Zora

## 2024-03-19 DIAGNOSIS — E66.01 SEVERE OBESITY (BMI 35.0-39.9) WITH COMORBIDITY (HCC): Primary | ICD-10-CM

## 2024-04-15 DIAGNOSIS — E66.01 SEVERE OBESITY (BMI 35.0-39.9) WITH COMORBIDITY (HCC): ICD-10-CM

## 2024-04-15 NOTE — TELEPHONE ENCOUNTER
Pt last seen 1/26/24 and was given wegovy 0.25 mg and started her first dose last month.   I did call her daughter and she is tolerating the dose ok but at times she does not feel hungry. Did you want to increase the dose or have her stay at the 0.25mg?  She would like a 3 month supply as it would be cheaper but not sure if she could get it because of the dose increasing per month. Her daughter does not want her to be increased to high.  Please advise

## 2024-04-18 DIAGNOSIS — E66.01 SEVERE OBESITY (BMI 35.0-39.9) WITH COMORBIDITY (HCC): Primary | ICD-10-CM

## 2024-07-26 ENCOUNTER — OFFICE VISIT (OUTPATIENT)
Dept: FAMILY MEDICINE CLINIC | Facility: CLINIC | Age: 84
End: 2024-07-26
Payer: COMMERCIAL

## 2024-07-26 VITALS
HEART RATE: 78 BPM | WEIGHT: 192.8 LBS | TEMPERATURE: 98.6 F | HEIGHT: 62 IN | SYSTOLIC BLOOD PRESSURE: 106 MMHG | OXYGEN SATURATION: 98 % | DIASTOLIC BLOOD PRESSURE: 72 MMHG | BODY MASS INDEX: 35.48 KG/M2

## 2024-07-26 DIAGNOSIS — E66.01 SEVERE OBESITY (BMI 35.0-39.9) WITH COMORBIDITY (HCC): ICD-10-CM

## 2024-07-26 DIAGNOSIS — R01.1 HEART MURMUR: ICD-10-CM

## 2024-07-26 DIAGNOSIS — R73.9 HYPERGLYCEMIA: ICD-10-CM

## 2024-07-26 DIAGNOSIS — E78.2 MIXED HYPERLIPIDEMIA: Primary | ICD-10-CM

## 2024-07-26 DIAGNOSIS — Z00.00 MEDICARE ANNUAL WELLNESS VISIT, SUBSEQUENT: ICD-10-CM

## 2024-07-26 PROCEDURE — G0439 PPPS, SUBSEQ VISIT: HCPCS | Performed by: FAMILY MEDICINE

## 2024-07-26 PROCEDURE — 99214 OFFICE O/P EST MOD 30 MIN: CPT | Performed by: FAMILY MEDICINE

## 2024-07-26 RX ORDER — ROSUVASTATIN CALCIUM 10 MG/1
10 TABLET, COATED ORAL DAILY
Qty: 90 TABLET | Refills: 3 | Status: SHIPPED | OUTPATIENT
Start: 2024-07-26

## 2024-07-26 NOTE — PROGRESS NOTES
Ambulatory Visit  Name: Barbara J Hoenscheid      : 1940      MRN: [id number]  Encounter Provider: Shreya Calixto MD  Encounter Date: 2024   Encounter department: [facility]    Assessment & Plan   1. Mixed hyperlipidemia  Assessment & Plan:  Controlled on rosuvastatin. Will check updated lipid panel. Follow-up in 3 months.  Orders:  -     rosuvastatin (CRESTOR) 10 MG tablet; Take 1 tablet (10 mg total) by mouth daily  -     CBC and differential; Future  -     Comprehensive metabolic panel; Future  -     Lipid Panel with Direct LDL reflex; Future  -     TSH, 3rd generation with Free T4 reflex; Future  2. Severe obesity (BMI 35.0-39.9) with comorbidity (HCC)  Assessment & Plan:  Improving on Wegovy.  Continue same dose for now.  I will consider increasing to 1 mg next visit.  Come back in 3 months.  Orders:  -     Semaglutide-Weight Management (WEGOVY) 0.5 MG/0.5ML; Inject 0.5 mL (0.5 mg total) under the skin once a week  3. Hyperglycemia  Assessment & Plan:  History of hyperglycemia in previous labs. Will recheck HgbA1c and continue to monitor.  Orders:  -     Hemoglobin A1C; Future  4. Medicare annual wellness visit, subsequent  Assessment & Plan:  It was discussed about immunizations, diet, exercise and safety measures.  5. Heart murmur  Assessment & Plan:  Asymptomatic. Echo was done in . Aortic stenosis mild to moderate       Preventive health issues were discussed with patient, and age appropriate screening tests were ordered as noted in patient's After Visit Summary. Personalized health advice and appropriate referrals for health education or preventive services given if needed, as noted in patient's After Visit Summary.    History of Present Illness     She is here today for follow-up multiple medical problems.  She has been taking her medications.  She denies any side effects.  She has been using Wegovy.  She lost 11 pounds since the last visit in January.  She  did not get her blood work done yet.  She continues with hip and lower back pain.       Patient Care Team:  Shreya Calixto MD as PCP - General (Family Medicine)  Shreya Calixto MD as PCP - [facility]CP-[facility]  Filiberto Shah MD    Review of Systems   Constitutional:  Negative for chills and fever.   HENT:  Negative for trouble swallowing.    Eyes:  Negative for visual disturbance.   Respiratory:  Negative for cough and shortness of breath.    Cardiovascular:  Negative for chest pain, palpitations and leg swelling.   Gastrointestinal:  Negative for abdominal pain, constipation and diarrhea.   Endocrine: Negative for cold intolerance and heat intolerance.   Genitourinary:  Negative for difficulty urinating and dysuria.   Musculoskeletal:  Negative for gait problem.   Skin:  Negative for rash.   Neurological:  Negative for dizziness, tremors, seizures and headaches.   Hematological:  Negative for adenopathy.   Psychiatric/Behavioral:  Negative for behavioral problems.      Medical History Reviewed by provider this encounter:  Tobacco  Allergies  Meds  Problems  Med Hx  Surg Hx  Fam Hx       Annual Wellness Visit Questionnaire   Shira is here for her Subsequent Wellness visit.     Health Risk Assessment:   Patient rates overall health as good. Patient feels that their physical health rating is same. Patient is satisfied with their life. Eyesight was rated as same. Hearing was rated as same. Patient feels that their emotional and mental health rating is same. Patients states they are never, rarely angry. Patient states they are sometimes unusually tired/fatigued. Pain experienced in the last 7 days has been some. Patient's pain rating has been 4/10. Patient states that she has experienced no weight loss or gain in last 6 months.     Depression Screening:   PHQ-9 Score: 1      Fall Risk Screening:   In the past year, patient has experienced: no history of falling in past year      Urinary  Incontinence Screening:   Patient has not leaked urine accidently in the last six months.     Home Safety:  Patient does not have trouble with stairs inside or outside of their home. Patient has working smoke alarms and has working carbon monoxide detector. Home safety hazards include: none.     Nutrition:   Current diet is Regular.     Medications:   Patient is currently taking over-the-counter supplements. OTC medications include: see medication list. Patient is able to manage medications.     Activities of Daily Living (ADLs)/Instrumental Activities of Daily Living (IADLs):   Walk and transfer into and out of bed and chair?: Yes  Dress and groom yourself?: Yes    Bathe or shower yourself?: Yes    Feed yourself? Yes  Do your laundry/housekeeping?: Yes  Manage your money, pay your bills and track your expenses?: Yes  Make your own meals?: Yes    Do your own shopping?: Yes    Previous Hospitalizations:   Any hospitalizations or ED visits within the last 12 months?: No      Advance Care Planning:   Living will: Yes    Durable POA for healthcare: Yes    Advanced directive: Yes      Cognitive Screening:   Provider or family/friend/caregiver concerned regarding cognition?: No    PREVENTIVE SCREENINGS      Cardiovascular Screening:    General: Screening Not Indicated and History Lipid Disorder      Diabetes Screening:     General: Risks and Benefits Discussed and Screening Current      Colorectal Cancer Screening:     General: Screening Not Indicated      Breast Cancer Screening:     General: Screening Not Indicated      Cervical Cancer Screening:    General: Screening Not Indicated      Osteoporosis Screening:    General: Risks and Benefits Discussed      Abdominal Aortic Aneurysm (AAA) Screening:        General: Risks and Benefits Discussed      Lung Cancer Screening:     General: Screening Not Indicated      Hepatitis C Screening:    General: Risks and Benefits Discussed    Screening, Brief Intervention, and Referral  to Treatment (SBIRT)    Screening  Typical number of drinks in a day: 0  Typical number of drinks in a week: 0  Interpretation: Low risk drinking behavior.    AUDIT-C Screenin) How often did you have a drink containing alcohol in the past year? never  2) How many drinks did you have on a typical day when you were drinking in the past year? 0  3) How often did you have 6 or more drinks on one occasion in the past year? never    AUDIT-C Score: 0  Interpretation: Score 0-2 (female): Negative screen for alcohol misuse    Single Item Drug Screening:  How often have you used an illegal drug (including marijuana) or a prescription medication for non-medical reasons in the past year? never    Single Item Drug Screen Score: 0  Interpretation: Negative screen for possible drug use disorder    Brief Intervention  Alcohol & drug use screenings were reviewed. No concerns regarding substance use disorder identified.     Review of Current Opioid Use    Opioid Risk Tool (ORT) Interpretation: Complete Opioid Risk Tool (ORT)    Other Counseling Topics:   Calcium and vitamin D intake and regular weightbearing exercise.     Social Determinants of Health     Food Insecurity: No Food Insecurity (2024)    Hunger Vital Sign    • Worried About Running Out of Food in the Last Year: Never true    • Ran Out of Food in the Last Year: Never true   Transportation Needs: No Transportation Needs (2024)    PRAPARE - Transportation    • Lack of Transportation (Medical): No    • Lack of Transportation (Non-Medical): No   Housing Stability: Low Risk  (2024)    Housing Stability Vital Sign    • Unable to Pay for Housing in the Last Year: No    • Number of Times Moved in the Last Year: 0    • Homeless in the Last Year: No   Utilities: Not At Risk (2024)    [facility] Utilities    • Threatened with loss of utilities: No     No results found.    Objective     /72 (BP Location: Right arm, Patient Position: Sitting, Cuff Size: Adult)   " Pulse 78   Temp 98.6 °F (37 °C) (Tympanic)   Ht 5' 2\" (1.575 m)   Wt 87.5 kg (192 lb 12.8 oz)   SpO2 98%   BMI 35.26 kg/m²     Physical Exam  Vitals and nursing note reviewed.   Constitutional:       Appearance: She is well-developed.   HENT:      Head: Normocephalic and atraumatic.   Eyes:      Pupils: Pupils are equal, round, and reactive to light.   Cardiovascular:      Rate and Rhythm: Normal rate and regular rhythm.      Heart sounds: Normal heart sounds.   Pulmonary:      Effort: Pulmonary effort is normal.      Breath sounds: Normal breath sounds.   Abdominal:      General: Bowel sounds are normal.      Palpations: Abdomen is soft.   Musculoskeletal:      Cervical back: Normal range of motion and neck supple.   Lymphadenopathy:      Cervical: No cervical adenopathy.   Skin:     General: Skin is warm.   Neurological:      Mental Status: She is alert and oriented to person, place, and time.           "

## 2024-07-26 NOTE — ASSESSMENT & PLAN NOTE
Improving on Wegovy.  Continue same dose for now.  I will consider increasing to 1 mg next visit.  Come back in 3 months.

## 2024-08-19 ENCOUNTER — TELEPHONE (OUTPATIENT)
Age: 84
End: 2024-08-19

## 2024-08-19 DIAGNOSIS — M54.50 CHRONIC BILATERAL LOW BACK PAIN WITHOUT SCIATICA: Primary | ICD-10-CM

## 2024-08-19 DIAGNOSIS — G89.29 CHRONIC BILATERAL LOW BACK PAIN WITHOUT SCIATICA: Primary | ICD-10-CM

## 2024-08-19 NOTE — TELEPHONE ENCOUNTER
I sent an order for x-ray for her lumbar spine(low back).  There is already a blood work order in the computer fasting to check for her kidney and liver function in addition to her cholesterol and CBC.

## 2024-08-19 NOTE — TELEPHONE ENCOUNTER
Pt's daughter called. Pt was seen on 7/26/24 for her AWV and discussed back pain at that time with her PCP.    Pt's daughter called stating that pt's back pain is not improving at all and is requesting that an x-ray order be placed as well as labs for kidney function.    Please call pt's daughter to advise if this request can be accommodated      Zora: [phone]

## 2024-08-19 NOTE — TELEPHONE ENCOUNTER
Patients daughter called again in regards to her mothers back pain requesting labs and xray , Please advise and return call

## 2024-08-23 DIAGNOSIS — E66.01 SEVERE OBESITY (BMI 35.0-39.9) WITH COMORBIDITY (HCC): ICD-10-CM

## 2024-08-25 PROBLEM — Z00.00 MEDICARE ANNUAL WELLNESS VISIT, SUBSEQUENT: Status: RESOLVED | Noted: 2018-12-07 | Resolved: 2024-08-25

## 2024-10-30 ENCOUNTER — OFFICE VISIT (OUTPATIENT)
Dept: FAMILY MEDICINE CLINIC | Facility: CLINIC | Age: 84
End: 2024-10-30
Payer: COMMERCIAL

## 2024-10-30 VITALS
SYSTOLIC BLOOD PRESSURE: 110 MMHG | WEIGHT: 189.2 LBS | DIASTOLIC BLOOD PRESSURE: 80 MMHG | BODY MASS INDEX: 34.82 KG/M2 | HEART RATE: 95 BPM | RESPIRATION RATE: 16 BRPM | TEMPERATURE: 97.6 F | OXYGEN SATURATION: 97 % | HEIGHT: 62 IN

## 2024-10-30 DIAGNOSIS — E66.01 SEVERE OBESITY (BMI 35.0-39.9) WITH COMORBIDITY (HCC): Primary | ICD-10-CM

## 2024-10-30 DIAGNOSIS — Z23 ENCOUNTER FOR IMMUNIZATION: ICD-10-CM

## 2024-10-30 DIAGNOSIS — E78.49 OTHER HYPERLIPIDEMIA: ICD-10-CM

## 2024-10-30 DIAGNOSIS — N18.31 STAGE 3A CHRONIC KIDNEY DISEASE (HCC): ICD-10-CM

## 2024-10-30 PROCEDURE — G0008 ADMIN INFLUENZA VIRUS VAC: HCPCS

## 2024-10-30 PROCEDURE — 99213 OFFICE O/P EST LOW 20 MIN: CPT | Performed by: FAMILY MEDICINE

## 2024-10-30 PROCEDURE — 90662 IIV NO PRSV INCREASED AG IM: CPT

## 2024-10-30 RX ORDER — SEMAGLUTIDE 1 MG/.5ML
INJECTION, SOLUTION SUBCUTANEOUS
Qty: 2 ML | Refills: 0 | Status: SHIPPED | OUTPATIENT
Start: 2024-10-30

## 2024-10-30 RX ORDER — LORATADINE 10 MG/1
10 TABLET ORAL
COMMUNITY

## 2024-10-30 RX ORDER — CHOLECALCIFEROL (VITAMIN D3) 50 MCG
2000 TABLET ORAL
COMMUNITY

## 2024-11-03 NOTE — ASSESSMENT & PLAN NOTE
Improving.  Continue on Wegovy 1 mg weekly.  Will continue to monitor.  Orders:    Semaglutide-Weight Management (Wegovy) 1 MG/0.5ML; Inject 1 mg under the skin weekly

## 2024-11-03 NOTE — ASSESSMENT & PLAN NOTE
Lab Results   Component Value Date    EGFR 51 07/08/2023    EGFR 58 10/12/2021    EGFR 49 08/23/2021    CREATININE 1.02 07/08/2023    CREATININE 0.94 10/12/2021    CREATININE 1.08 08/23/2021   It was discussed with patient to increase oral hydration.  Continue to monitor her GFR.

## 2024-11-08 NOTE — PROGRESS NOTES
Ambulatory Visit  Name: Barbara J Hoenscheid      : 1940      MRN: [id number]  Encounter Provider: Shreya Calixto MD  Encounter Date: 10/30/2024   Encounter department: [facility]    Assessment & Plan  Severe obesity (BMI 35.0-39.9) with comorbidity (HCC)  Improving.  Continue on Wegovy 1 mg weekly.  Will continue to monitor.  Orders:    Semaglutide-Weight Management (Wegovy) 1 MG/0.5ML; Inject 1 mg under the skin weekly    Encounter for immunization    Orders:    influenza vaccine, high-dose, PF 0.5 mL (Fluzone High Dose)    Other hyperlipidemia  Controlled on rosuvastatin. Will check updated lipid panel. Follow-up in 3 months.         Stage 3a chronic kidney disease (HCC)  Lab Results   Component Value Date    EGFR 51 2023    EGFR 58 10/12/2021    EGFR 49 2021    CREATININE 1.02 2023    CREATININE 0.94 10/12/2021    CREATININE 1.08 2021   It was discussed with patient to increase oral hydration.  Continue to monitor her GFR.              History of Present Illness     She is here today for follow-up multiple medical problems.  She has been taking her medications.  She denies any side effect.  She has been taking Wegovy 1 mg weekly.  She lost 60 pounds since last visit.  She has been having some problems with constipation but taking some stool softener and that is helping.  Denies any other complaint other than arthralgia.      Review of Systems   Constitutional:  Negative for chills and fever.   HENT:  Negative for trouble swallowing.    Eyes:  Negative for visual disturbance.   Respiratory:  Negative for cough and shortness of breath.    Cardiovascular:  Negative for chest pain, palpitations and leg swelling.   Gastrointestinal:  Negative for abdominal pain, constipation and diarrhea.   Endocrine: Negative for cold intolerance and heat intolerance.   Genitourinary:  Negative for difficulty urinating and dysuria.   Musculoskeletal:  Positive for  Chief Complaint   Patient presents with    Follow-Up       PCP: Aminta Senior D.O.    Requesting Provider: Aminta Senior D.O.    HPI: I was asked by Dr. Aminta Senior, to see Cynthia Rod in consultation for evaluation of bladder US anomaly. Cynthia is a 5 y.o. female who had a renal US done because of positive family Hx of hydronephrosis in dad and aunt.  Dad had congenital hydronephrosis on the right and his kidney needed to be removed. His sister (aunt) had a crossing vessel that needed correction at 2 years of age.   Prenatal US Neg for hydronephrosis.  Cynthia otherwise is doing well . She has no Hist of UTI, hematuria.  She is still incontinent but is being trained.  Last renal US shows a cyst anteriorly (see report) that is rather small, about 1/2  cm diameter.     Coming today for urine check only , no US  ROS all NEG     Current Outpatient Medications:     ibuprofen (MOTRIN) 100 MG/5ML Suspension, Take 7 mL by mouth every 6 hours as needed for Moderate Pain or Inflammation. (Patient not taking: Reported on 4/17/2024), Disp: 118 mL, Rfl: 0    Past Medical History:   Diagnosis Date    No pertinent past medical history    Born full term    Social History     Socioeconomic History    Marital status: Single     Spouse name: Not on file    Number of children: Not on file    Years of education: Not on file    Highest education level: Not on file   Occupational History    Not on file   Tobacco Use    Smoking status: Not on file    Smokeless tobacco: Not on file   Substance and Sexual Activity    Alcohol use: Not on file    Drug use: Not on file    Sexual activity: Not on file   Other Topics Concern    Not on file   Social History Narrative    Not on file     Social Drivers of Health     Financial Resource Strain: Not on file   Food Insecurity: Not on file   Transportation Needs: Not on file   Physical Activity: Not on file   Housing Stability: Not on file       Family History   Problem Relation Age of Onset     "Kidney Disease Father         kidney atrophied, hydronephrosis, congenital, recently removed    Kidney Disease Paternal Aunt         vessel wrapped around ureter    Alcohol/Drug Maternal Grandfather         etoh (Copied from mother's family history at birth)    Lung Disease Maternal Grandfather         smoker (Copied from mother's family history at birth)       Review of Systems   Constitutional:  Negative for fever and unexpected weight change.   HENT: Negative.  Negative for ear pain.    Eyes: Negative.    Respiratory: Negative.  Negative for wheezing.    Cardiovascular: Negative.    Gastrointestinal:  Positive for constipation (occasional).   Endocrine: Negative.    Genitourinary:  Positive for enuresis. Negative for dysuria and hematuria.        See PI   Musculoskeletal: Negative.    Neurological: Negative.  Negative for speech difficulty.   Hematological: Negative.    Psychiatric/Behavioral: Negative.         Ambulatory Vitals  BP 87/51 (BP Location: Right arm, Patient Position: Sitting, BP Cuff Size: Child)   Temp 36.6 °C (97.9 °F) (Temporal)   Ht 1.06 m (3' 5.73\")   Wt 15.2 kg (33 lb 6.4 oz)  Body mass index is 13.48 kg/m².    Physical Exam  Constitutional:       General: She is not in acute distress.     Appearance: She is normal weight.   HENT:      Head: Normocephalic.      Right Ear: External ear normal.      Left Ear: External ear normal.      Nose: Nose normal.      Mouth/Throat:      Mouth: Mucous membranes are moist.   Eyes:      Conjunctiva/sclera: Conjunctivae normal.   Cardiovascular:      Rate and Rhythm: Normal rate and regular rhythm.      Pulses: Normal pulses.      Heart sounds: Normal heart sounds. No murmur heard.  Pulmonary:      Effort: Pulmonary effort is normal.      Breath sounds: Normal breath sounds.   Abdominal:      General: Abdomen is flat. Bowel sounds are normal. There is no distension.      Palpations: There is no mass.   Musculoskeletal:         General: No swelling.      " arthralgias. Negative for gait problem.   Skin:  Negative for rash.   Neurological:  Negative for dizziness, tremors, seizures and headaches.   Hematological:  Negative for adenopathy.   Psychiatric/Behavioral:  Negative for behavioral problems.      Past Medical History:   Diagnosis Date    Anxiety     Depression     GERD (gastroesophageal reflux disease)     Hyperlipidemia     Kidney stone     Vitamin D deficiency      Past Surgical History:   Procedure Laterality Date    CATARACT EXTRACTION      CYSTOTOMY      with direct removal of calculus    HEMORROIDECTOMY  2010    KIDNEY STONE SURGERY      requiring open removal on the left side    LITHOTRIPSY      POLYPECTOMY      REPLACEMENT TOTAL KNEE  2011    SINUS SURGERY      TONSILLECTOMY AND ADENOIDECTOMY       Family History   Problem Relation Age of Onset    Heart attack Mother     Diverticulosis Father     Other Brother         back surgery and one brain surgery    Asthma Daughter      Social History     Tobacco Use    Smoking status: Former     Current packs/day: 0.00     Types: Cigarettes     Quit date:      Years since quittin.    Smokeless tobacco: Never    Tobacco comments:     no passive smoke exposure   Vaping Use    Vaping status: Never Used   Substance and Sexual Activity    Alcohol use: No    Drug use: No    Sexual activity: Not on file     Current Outpatient Medications on File Prior to Visit   Medication Sig    ALPRAZolam (XANAX) 0.25 mg tablet Take 1 tablet (0.25 mg total) by mouth 2 (two) times a day    Cholecalciferol (Vitamin D) 50 MCG (2000 UT) tablet Take 2,000 Units by mouth    Cholecalciferol (VITAMIN D3) 5000 units CAPS Take by mouth     HYDROcodone-acetaminophen (NORCO) 7.5-325 mg per tablet Take 1 tablet by mouth as needed    loratadine (Claritin) 10 mg tablet Take 10 mg by mouth    PARoxetine (PAXIL) 10 mg tablet TAKE 2 TABLETS BY MOUTH DAILY    rosuvastatin (CRESTOR) 10 MG tablet Take 1 tablet (10 mg total) by mouth daily     "loratadine (CLARITIN) 10 mg tablet Take 10 mg by mouth daily (Patient not taking: Reported on 10/30/2024)     Allergies   Allergen Reactions    Ciprofloxacin      Other reaction(s): headache,dizziness, chest pain    Acetaminophen      Pt states she currently takes without issue    Fentanyl     Propoxyphene      Immunization History   Administered Date(s) Administered    INFLUENZA 12/07/2018, 10/31/2020    Influenza Split High Dose Preservative Free IM 10/30/2024    Influenza, high dose seasonal 0.7 mL 12/07/2018    Influenza, seasonal, injectable 10/12/2010    Pneumococcal Polysaccharide PPV23 02/12/1999, 10/30/2008    Tdap 06/28/2019     Objective     /80 (BP Location: Left arm, Patient Position: Sitting, Cuff Size: Standard)   Pulse 95   Temp 97.6 °F (36.4 °C) (Tympanic)   Resp 16   Ht 5' 2\" (1.575 m)   Wt 85.8 kg (189 lb 3.2 oz)   SpO2 97%   BMI 34.61 kg/m²     Physical Exam  Vitals and nursing note reviewed.   Constitutional:       Appearance: She is well-developed.   HENT:      Head: Normocephalic and atraumatic.   Eyes:      Pupils: Pupils are equal, round, and reactive to light.   Cardiovascular:      Rate and Rhythm: Normal rate and regular rhythm.      Heart sounds: Normal heart sounds.   Pulmonary:      Effort: Pulmonary effort is normal.      Breath sounds: Normal breath sounds.   Abdominal:      General: Bowel sounds are normal.      Palpations: Abdomen is soft.   Musculoskeletal:      Cervical back: Normal range of motion and neck supple.   Lymphadenopathy:      Cervical: No cervical adenopathy.   Skin:     General: Skin is warm.   Neurological:      Mental Status: She is alert and oriented to person, place, and time.         " Cervical back: Normal range of motion.   Skin:     General: Skin is warm and dry.      Capillary Refill: Capillary refill takes less than 2 seconds.   Neurological:      Mental Status: She is alert. Mental status is at baseline.   Psychiatric:         Mood and Affect: Mood normal.         Labs:  4/24/2024 2:07 PM  HISTORY/REASON FOR EXAM:  father with congenital kidney disease and hydronephrosis, recently had to have nephrectomy. Check bilateral kidneys  Renal ultrasound.  COMPARISON:  None  FINDINGS:  The right kidney measures 7.43 cm.  The right kidney appears normal in contour and parenchymal echotexture. The corticomedullary differentiation is preserved. The right renal collecting system is not dilated. No hydronephrosis. There are no renal   calculi.  The left kidney measures 7.26 cm. The left kidney appears normal in contour and parenchymal echotexture. The corticomedullary differentiation is preserved. The left renal collecting system is not dilated. No hydronephrosis. There are no renal calculi.     The bladder demonstrates no focal wall abnormality. Hypoechoic lesion anterior to urinary bladder measures 0.7 x 0.6 x 0.4 cm.         Latest Reference Range & Units 11/08/24 11:10   POC Color Negative  yellow   POC Appearance Negative  Clear   POC Specific Gravity <1.005 - >1.030  1.020   POC Urine PH 5.0 - 8.0  7.0   POC Glucose Negative mg/dL Neg   POC Ketones Negative mg/dL Trace   POC Protein Negative mg/dL Neg   POC Nitrites Negative  Neg   POC Leukocyte Esterase Negative  Neg   POC Blood Negative  Neg   POC Bilirubin Negative mg/dL Neg   POC Urobiligen Negative (0.2) mg/dL 0.2        IMPRESSION:     1.  No hydronephrosis. No focal renal abnormalities.     2.  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    Assessment:    Anterior Bladder cyst   R/O urachal remnant   Patient asymptomatic and Urine clear    Plan:  1. Urachal cyst    - POCT Urinalysis    RTC 6 month (post CASEY)    Discuss  findings and plan of action          Gabriel Mosquera MD  Pediatric nephrology  [facility]

## 2024-12-02 ENCOUNTER — TELEPHONE (OUTPATIENT)
Dept: FAMILY MEDICINE CLINIC | Facility: CLINIC | Age: 84
End: 2024-12-02

## 2024-12-12 DIAGNOSIS — E66.01 SEVERE OBESITY (BMI 35.0-39.9) WITH COMORBIDITY (HCC): ICD-10-CM

## 2024-12-12 RX ORDER — SEMAGLUTIDE 1 MG/.5ML
INJECTION, SOLUTION SUBCUTANEOUS
Qty: 6 ML | Refills: 1 | Status: SHIPPED | OUTPATIENT
Start: 2024-12-12

## 2025-03-17 DIAGNOSIS — F32.89 OTHER DEPRESSION: ICD-10-CM

## 2025-03-18 RX ORDER — PAROXETINE 10 MG/1
20 TABLET, FILM COATED ORAL DAILY
Qty: 180 TABLET | Refills: 1 | Status: SHIPPED | OUTPATIENT
Start: 2025-03-18

## 2025-05-20 ENCOUNTER — APPOINTMENT (OUTPATIENT)
Dept: LAB | Facility: IMAGING CENTER | Age: 85
End: 2025-05-20
Payer: COMMERCIAL

## 2025-05-20 DIAGNOSIS — E78.2 MIXED HYPERLIPIDEMIA: ICD-10-CM

## 2025-05-20 DIAGNOSIS — R73.9 HYPERGLYCEMIA: ICD-10-CM

## 2025-05-20 LAB
ALBUMIN SERPL BCG-MCNC: 4.3 G/DL (ref 3.5–5)
ALP SERPL-CCNC: 105 U/L (ref 34–104)
ALT SERPL W P-5'-P-CCNC: 12 U/L (ref 7–52)
ANION GAP SERPL CALCULATED.3IONS-SCNC: 10 MMOL/L (ref 4–13)
AST SERPL W P-5'-P-CCNC: 16 U/L (ref 13–39)
BASOPHILS # BLD AUTO: 0.04 THOUSANDS/ÂΜL (ref 0–0.1)
BASOPHILS NFR BLD AUTO: 1 % (ref 0–1)
BILIRUB SERPL-MCNC: 0.41 MG/DL (ref 0.2–1)
BUN SERPL-MCNC: 20 MG/DL (ref 5–25)
CALCIUM SERPL-MCNC: 9.8 MG/DL (ref 8.4–10.2)
CHLORIDE SERPL-SCNC: 105 MMOL/L (ref 96–108)
CHOLEST SERPL-MCNC: 284 MG/DL (ref ?–200)
CO2 SERPL-SCNC: 27 MMOL/L (ref 21–32)
CREAT SERPL-MCNC: 0.79 MG/DL (ref 0.6–1.3)
EOSINOPHIL # BLD AUTO: 0.13 THOUSAND/ÂΜL (ref 0–0.61)
EOSINOPHIL NFR BLD AUTO: 2 % (ref 0–6)
ERYTHROCYTE [DISTWIDTH] IN BLOOD BY AUTOMATED COUNT: 14.2 % (ref 11.6–15.1)
EST. AVERAGE GLUCOSE BLD GHB EST-MCNC: 105 MG/DL
GFR SERPL CREATININE-BSD FRML MDRD: 69 ML/MIN/1.73SQ M
GLUCOSE P FAST SERPL-MCNC: 95 MG/DL (ref 65–99)
HBA1C MFR BLD: 5.3 %
HCT VFR BLD AUTO: 45.3 % (ref 34.8–46.1)
HDLC SERPL-MCNC: 63 MG/DL
HGB BLD-MCNC: 14.4 G/DL (ref 11.5–15.4)
IMM GRANULOCYTES # BLD AUTO: 0.02 THOUSAND/UL (ref 0–0.2)
IMM GRANULOCYTES NFR BLD AUTO: 0 % (ref 0–2)
LDLC SERPL CALC-MCNC: 197 MG/DL (ref 0–100)
LYMPHOCYTES # BLD AUTO: 1.91 THOUSANDS/ÂΜL (ref 0.6–4.47)
LYMPHOCYTES NFR BLD AUTO: 22 % (ref 14–44)
MCH RBC QN AUTO: 28.2 PG (ref 26.8–34.3)
MCHC RBC AUTO-ENTMCNC: 31.8 G/DL (ref 31.4–37.4)
MCV RBC AUTO: 89 FL (ref 82–98)
MONOCYTES # BLD AUTO: 0.66 THOUSAND/ÂΜL (ref 0.17–1.22)
MONOCYTES NFR BLD AUTO: 8 % (ref 4–12)
NEUTROPHILS # BLD AUTO: 5.78 THOUSANDS/ÂΜL (ref 1.85–7.62)
NEUTS SEG NFR BLD AUTO: 67 % (ref 43–75)
NRBC BLD AUTO-RTO: 0 /100 WBCS
PLATELET # BLD AUTO: 307 THOUSANDS/UL (ref 149–390)
PMV BLD AUTO: 11.1 FL (ref 8.9–12.7)
POTASSIUM SERPL-SCNC: 4.7 MMOL/L (ref 3.5–5.3)
PROT SERPL-MCNC: 7.6 G/DL (ref 6.4–8.4)
RBC # BLD AUTO: 5.11 MILLION/UL (ref 3.81–5.12)
SODIUM SERPL-SCNC: 142 MMOL/L (ref 135–147)
TRIGL SERPL-MCNC: 122 MG/DL (ref ?–150)
TSH SERPL DL<=0.05 MIU/L-ACNC: 2.34 UIU/ML (ref 0.45–4.5)
WBC # BLD AUTO: 8.54 THOUSAND/UL (ref 4.31–10.16)

## 2025-05-20 PROCEDURE — 85025 COMPLETE CBC W/AUTO DIFF WBC: CPT

## 2025-05-20 PROCEDURE — 80053 COMPREHEN METABOLIC PANEL: CPT

## 2025-05-20 PROCEDURE — 36415 COLL VENOUS BLD VENIPUNCTURE: CPT

## 2025-05-20 PROCEDURE — 80061 LIPID PANEL: CPT

## 2025-05-20 PROCEDURE — 84443 ASSAY THYROID STIM HORMONE: CPT

## 2025-05-20 PROCEDURE — 83036 HEMOGLOBIN GLYCOSYLATED A1C: CPT

## 2025-05-26 DIAGNOSIS — E78.2 MIXED HYPERLIPIDEMIA: ICD-10-CM

## 2025-05-28 RX ORDER — ROSUVASTATIN CALCIUM 10 MG/1
10 TABLET, COATED ORAL DAILY
Qty: 90 TABLET | Refills: 0 | Status: SHIPPED | OUTPATIENT
Start: 2025-05-28 | End: 2025-05-30 | Stop reason: SDUPTHER

## 2025-05-30 DIAGNOSIS — E78.2 MIXED HYPERLIPIDEMIA: ICD-10-CM

## 2025-05-30 RX ORDER — ROSUVASTATIN CALCIUM 10 MG/1
10 TABLET, COATED ORAL DAILY
Qty: 90 TABLET | Refills: 0 | Status: SHIPPED | OUTPATIENT
Start: 2025-05-30

## 2025-06-02 DIAGNOSIS — E78.2 MIXED HYPERLIPIDEMIA: ICD-10-CM

## 2025-06-02 RX ORDER — ROSUVASTATIN CALCIUM 10 MG/1
10 TABLET, COATED ORAL DAILY
Qty: 90 TABLET | Refills: 0 | Status: CANCELLED | OUTPATIENT
Start: 2025-06-02

## 2025-06-03 ENCOUNTER — RESULTS FOLLOW-UP (OUTPATIENT)
Dept: FAMILY MEDICINE CLINIC | Facility: CLINIC | Age: 85
End: 2025-06-03

## 2025-06-12 ENCOUNTER — OFFICE VISIT (OUTPATIENT)
Dept: CARDIOLOGY CLINIC | Facility: CLINIC | Age: 85
End: 2025-06-12
Payer: COMMERCIAL

## 2025-06-12 VITALS
SYSTOLIC BLOOD PRESSURE: 120 MMHG | BODY MASS INDEX: 34.57 KG/M2 | HEART RATE: 69 BPM | DIASTOLIC BLOOD PRESSURE: 60 MMHG | WEIGHT: 189 LBS

## 2025-06-12 DIAGNOSIS — E66.01 MORBID OBESITY (HCC): ICD-10-CM

## 2025-06-12 DIAGNOSIS — R01.1 HEART MURMUR: ICD-10-CM

## 2025-06-12 DIAGNOSIS — Z78.9 STATIN INTOLERANCE: ICD-10-CM

## 2025-06-12 DIAGNOSIS — E66.01 SEVERE OBESITY (BMI 35.0-39.9) WITH COMORBIDITY (HCC): ICD-10-CM

## 2025-06-12 DIAGNOSIS — I35.0 NONRHEUMATIC AORTIC VALVE STENOSIS: Primary | ICD-10-CM

## 2025-06-12 DIAGNOSIS — N18.31 STAGE 3A CHRONIC KIDNEY DISEASE (HCC): ICD-10-CM

## 2025-06-12 DIAGNOSIS — R00.0 TACHYCARDIA: ICD-10-CM

## 2025-06-12 DIAGNOSIS — E78.49 OTHER HYPERLIPIDEMIA: ICD-10-CM

## 2025-06-12 PROCEDURE — 99214 OFFICE O/P EST MOD 30 MIN: CPT

## 2025-06-12 PROCEDURE — 93000 ELECTROCARDIOGRAM COMPLETE: CPT

## 2025-06-12 NOTE — PROGRESS NOTES
Cardiology   MD Vignesh Boykin MD, FACC  Bud Rangel DO, [facility] FACP, FASNC Ather Mansoor, MD Rujul Patel, DO, [facility]  Steve Mcginnis DO, [facility] FASNC  -------------------------------------------------------------------  [facility]  [address] [place] [address]  Phone: [phone]  Fax: [phone]  06/12/25  Barbara J Hoenscheid  YOB: 1940   MRN: [id number]      Referring Physician: No referring provider defined for this encounter.     HPI: Barbara J Hoenscheid is a 85 y.o. female with:   Obesity  Chronic pain  GERD  CKD 3  cognitive dysfunction  Hyperlipidemia  Statin intolerance    She presents for follow-up.  Her daughter is with her today.  She is having issues with memory loss.  Daughter is concerned this is due to the statin.  She stopped her statin has improved but her cholesterol has worsened dramatically.  Blood pressure is controlled, chest pain.  Does note some shortness of breath but this is likely due to deconditioning pretty much she only stays in the house and does not get much exercise at all    Review of Systems   Constitutional:  Negative for chills and fever.   HENT:  Negative for facial swelling and sore throat.    Eyes:  Negative for visual disturbance.   Respiratory:  Negative for cough, chest tightness, shortness of breath and wheezing.    Cardiovascular:  Negative for chest pain, palpitations and leg swelling.   Gastrointestinal:  Negative for abdominal pain, blood in stool, constipation, diarrhea, nausea and vomiting.   Endocrine: Negative for cold intolerance and heat intolerance.   Genitourinary:  Negative for decreased urine volume, difficulty urinating, dysuria and hematuria.   Musculoskeletal:  Negative for arthralgias, back pain and myalgias.   Skin:  Negative for rash.   Neurological:  Negative for dizziness, syncope, weakness and numbness.        Memory loss   Psychiatric/Behavioral:  Negative for  agitation, behavioral problems and confusion. The patient is not nervous/anxious.         OBJECTIVE  Vitals:    06/12/25 1635   BP: 120/60   Pulse: 69       Physical Exam  Constitutional: awake, alert and oriented, in no acute distress, no obvious deformities, obese female  Head: Normocephalic, without obvious abnormality, atraumatic  Eyes: conjunctivae clear and moist. Sclera anicteric.  No xanthelasmas. Pupils equal bilaterally.  Extraocular motions are full.  Ear nose mouth and throat: ears are symmetrical bilaterally, hearing appears to be equal bilaterally, no nasal discharge or epistaxis, oropharynx is clear with moist mucous membranes  Neck:  Trachea is midline, neck is supple, no thyromegaly or significant lymphadenopathy, there is full range of motion.  Lungs: clear to auscultation bilaterally, no wheezes, no rales, no rhonchi, no accessory muscle use, breathing is nonlabored  Heart: Regular rhythm with a Normal heart rate, S1, S2 normal, 2 out of 6 murmur right upper sternal border, systolic no click, rub or gallop, No lower extremity edema  Abdomen: soft, non-tender; bowel sounds normal; no masses,  no organomegaly  Psychiatric:  Patient is oriented to time, place, person, mood/affect is negative for depression, anxiety, agitation, appears to have appropriate insight  Skin: Skin is warm, dry, intact. No obvious rashes or lesions on exposed extremities.  Nail beds are pink with no cyanosis or clubbing.    EKG:  Results for orders placed or performed in visit on 06/12/25   POCT ECG    Impression    Normal Sinus Rhythm, Normal EKG.          IMPRESSION:  Obesity  Chronic pain  GERD  CKD 3  cognitive dysfunction  Hyperlipidemia  Statin intolerance    DISCUSSION/RECOMMENDATIONS:  She presents for follow-up.  Having issues with statins.  Causing memory loss.  When she stopped this her memory has improved but cholesterol has worsened dramatically.  Interested in nonstatin medications as she seems to not be able  to tolerate any statin.  Will Rx Leqvio today.  Check new lipid panel 6 months  For her obesity, she was on Wegovy but this somehow stopped.  Will try to place back on Wegovy today for weight loss for obesity along with hyperlipidemia  Does have murmur on exam, has known mild to moderate aortic stenosis, would hold off on repeat echo this year, consider new echo 2026    Steve Mcginnis DO, FACC, FASE, FASNC  --------------------------------------------------------------------------------  TREADMILL STRESS  No results found for this or any previous visit.     ----------------------------------------------------------------------------------------------  NUCLEAR STRESS TEST: No results found for this or any previous visit.    No results found for this or any previous visit.      --------------------------------------------------------------------------------  CATH:  No results found for this or any previous visit.    --------------------------------------------------------------------------------  ECHO:   No results found for this or any previous visit.    No results found for this or any previous visit.    --------------------------------------------------------------------------------  HOLTER  No results found for this or any previous visit.    No results found for this or any previous visit.    --------------------------------------------------------------------------------  CAROTIDS  No results found for this or any previous visit.     --------------------------------------------------------------------------------  Diagnoses and all orders for this visit:    Nonrheumatic aortic valve stenosis  -     POCT ECG    Tachycardia  -     POCT ECG    Other hyperlipidemia  -     Semaglutide-Weight Management (WEGOVY) 0.25 MG/0.5ML; Inject 0.5 mL (0.25 mg total) under the skin once a week  -     Inclisiran Sodium (LEQVIO) subcutaneous injection 284 mg  -     Injection procedure prior authorization; Future    Morbid obesity  (HCC)  -     Semaglutide-Weight Management (WEGOVY) 0.25 MG/0.5ML; Inject 0.5 mL (0.25 mg total) under the skin once a week    Severe obesity (BMI 35.0-39.9) with comorbidity (HCC)  -     Semaglutide-Weight Management (WEGOVY) 0.25 MG/0.5ML; Inject 0.5 mL (0.25 mg total) under the skin once a week    Stage 3a chronic kidney disease (HCC)    Statin intolerance  -     Inclisiran Sodium (LEQVIO) subcutaneous injection 284 mg  -     Injection procedure prior authorization; Future    Heart murmur    Other orders  -     NON FORMULARY; Magnesium L-Threonate       ======================================================    Past Medical History[1]  Past Surgical History[2]      Medications  Current Medications[3]     Allergies[4]    Social History     Socioeconomic History    Marital status:      Spouse name: Not on file    Number of children: Not on file    Years of education: Not on file    Highest education level: Not on file   Occupational History    Not on file   Tobacco Use    Smoking status: Former     Current packs/day: 0.00     Types: Cigarettes     Quit date:      Years since quittin.4    Smokeless tobacco: Never    Tobacco comments:     no passive smoke exposure   Vaping Use    Vaping status: Never Used   Substance and Sexual Activity    Alcohol use: No    Drug use: No    Sexual activity: Not on file   Other Topics Concern    Not on file   Social History Narrative    Not on file     Social Drivers of Health     Financial Resource Strain: Not on file   Food Insecurity: No Food Insecurity (2024)    Nursing - Inadequate Food Risk Classification     Worried About Running Out of Food in the Last Year: Never true     Ran Out of Food in the Last Year: Never true     Ran Out of Food in the Last Year: Not on file   Transportation Needs: No Transportation Needs (2024)    PRAPARE - Transportation     Lack of Transportation (Medical): No     Lack of Transportation (Non-Medical): No   Physical Activity:  "Not on file   Stress: Not on file   Social Connections: Not on file   Intimate Partner Violence: Not on file   Housing Stability: Low Risk  (7/26/2024)    Housing Stability Vital Sign     Unable to Pay for Housing in the Last Year: No     Number of Times Moved in the Last Year: 0     Homeless in the Last Year: No        Family History[5]    Lab Results   Component Value Date    WBC 8.54 05/20/2025    HGB 14.4 05/20/2025    HCT 45.3 05/20/2025    MCV 89 05/20/2025     05/20/2025      Lab Results   Component Value Date    SODIUM 142 05/20/2025    K 4.7 05/20/2025     05/20/2025    CO2 27 05/20/2025    BUN 20 05/20/2025    CREATININE 0.79 05/20/2025    CALCIUM 9.8 05/20/2025      Lab Results   Component Value Date    HGBA1C 5.3 05/20/2025      Lab Results   Component Value Date    CHOL 178 05/26/2018     Lab Results   Component Value Date    HDL 63 05/20/2025    HDL 70 07/08/2023    HDL 64 08/23/2021     Lab Results   Component Value Date    LDLCALC 197 (H) 05/20/2025    LDLCALC 104 (H) 07/08/2023    LDLCALC 107 (H) 08/23/2021     Lab Results   Component Value Date    TRIG 122 05/20/2025    TRIG 144 07/08/2023    TRIG 130 08/23/2021     No results found for: \"CHOLHDL\"   No results found for: \"INR\", \"PROTIME\"       Patient Active Problem List    Diagnosis Date Noted    Aortic stenosis 01/26/2024    Heart murmur 07/17/2023    Notalgia 04/29/2022    Stage 3a chronic kidney disease (HCC) 12/17/2021    Hyperglycemia 12/17/2021    Decreased GFR 12/17/2021    Flank pain 08/16/2021    Chronic bilateral back pain 08/16/2021    Injury of right shoulder 08/10/2021    Chronic pain of both knees 07/09/2021    Morbid obesity (HCC) 06/18/2021    Body mass index (BMI)40.0-44.9, adult 06/17/2021    Exposure to COVID-19 virus 12/29/2020    Viral infection, unspecified 12/29/2020    BMI 38.0-38.9,adult 06/28/2019    Memory deficit 06/28/2019    Allergic rhinitis 12/07/2018    Encounter for immunization 12/07/2018    Vitamin " "D insufficiency 12/07/2018    Current drug use 09/12/2017    Kidney stone 01/18/2016    Chronic pain disorder 08/03/2015    Degeneration of lumbosacral intervertebral disc 04/28/2015    Spinal stenosis of lumbar region 04/28/2015    Tachycardia 03/13/2012    Low back pain 03/25/2011    Severe obesity (BMI 35.0-39.9) with comorbidity (HCC) 02/23/2011    Sjogren's syndrome (HCC) 02/23/2011    Hyperlipidemia 11/15/2010    Urge incontinence 08/13/2009    Uterovaginal prolapse, incomplete 08/13/2009    Cervical polyp 06/16/2008    Hemorrhoids 06/16/2008    Osteoarthritis of knee 07/09/2004    Gastroesophageal reflux disease 07/09/2004    Osteopenia 07/09/2004    Cataract 07/09/2004    Sleep apnea 08/12/2000    Hiatal hernia 03/12/1999    Anxiety state 03/12/1998    Depression 03/12/1998       Portions of the record may have been created with voice recognition software. Occasional wrong word or \"sound a like\" substitutions may have occurred due to the inherent limitations of voice recognition software. Read the chart carefully and recognize, using context, where substitutions have occurred.    Steve Mcginnis DO, FACC  6/12/2025 5:15 PM               [1]   Past Medical History:  Diagnosis Date    Anxiety     Depression     GERD (gastroesophageal reflux disease)     Hyperlipidemia     Kidney stone     Vitamin D deficiency    [2]   Past Surgical History:  Procedure Laterality Date    CATARACT EXTRACTION      CYSTOTOMY      with direct removal of calculus    HEMORROIDECTOMY  2010    KIDNEY STONE SURGERY      requiring open removal on the left side    LITHOTRIPSY      POLYPECTOMY      REPLACEMENT TOTAL KNEE  2011    SINUS SURGERY      TONSILLECTOMY AND ADENOIDECTOMY     [3]   Current Outpatient Medications   Medication Sig Dispense Refill    ALPRAZolam (XANAX) 0.25 mg tablet Take 1 tablet (0.25 mg total) by mouth 2 (two) times a day 60 tablet 0    Cholecalciferol (Vitamin D) 50 MCG (2000 UT) tablet Take 2,000 Units by mouth  "     HYDROcodone-acetaminophen (NORCO) 7.5-325 mg per tablet Take 1 tablet by mouth as needed      loratadine (Claritin) 10 mg tablet Take 10 mg by mouth      NON FORMULARY Magnesium L-Threonate      PARoxetine (PAXIL) 10 mg tablet TAKE 2 TABLETS BY MOUTH DAILY 180 tablet 1    Semaglutide-Weight Management (WEGOVY) 0.25 MG/0.5ML Inject 0.5 mL (0.25 mg total) under the skin once a week 2 mL 1    Cholecalciferol (VITAMIN D3) 5000 units CAPS Take by mouth  (Patient not taking: Reported on 6/12/2025)      loratadine (CLARITIN) 10 mg tablet Take 10 mg by mouth daily (Patient not taking: Reported on 6/12/2025)       Current Facility-Administered Medications   Medication Dose Route Frequency Provider Last Rate Last Admin    [START ON 6/18/2025] Inclisiran Sodium (LEQVIO) subcutaneous injection 284 mg  284 mg Subcutaneous Once        [4]   Allergies  Allergen Reactions    Ciprofloxacin      Other reaction(s): headache,dizziness, chest pain    Acetaminophen      Pt states she currently takes without issue    Fentanyl     Propoxyphene    [5]   Family History  Problem Relation Name Age of Onset    Heart attack Mother      Diverticulosis Father      Other Brother          back surgery and one brain surgery    Asthma Daughter

## 2025-06-13 ENCOUNTER — TELEPHONE (OUTPATIENT)
Age: 85
End: 2025-06-13

## 2025-06-13 ENCOUNTER — TELEPHONE (OUTPATIENT)
Dept: CARDIOLOGY CLINIC | Facility: CLINIC | Age: 85
End: 2025-06-13

## 2025-06-13 NOTE — TELEPHONE ENCOUNTER
PA for wegovy 0.25 mg/0.5 ml SUBMITTED to [facility]     via    []CMM-KEY:   [x]Surescripts-Case ID # [id number]   []Availity-Auth ID # NDC #   []Faxed to plan   []Other website   []Phone call Case ID #     []PA sent as URGENT    All office notes, labs and other pertaining documents and studies sent. Clinical questions answered. Awaiting determination from insurance company.     Turnaround time for your insurance to make a decision on your Prior Authorization can take 7-21 business days.

## 2025-06-13 NOTE — TELEPHONE ENCOUNTER
PA for wegovy 0.25 mg/0.5 ml  APPROVED     Date(s) approved June 13, 2025 to December 31, 2025     Case #[id number]     Patient advised by          []MyChart Message  [x]Phone call   []LMOM  []L/M to call office as no active Communication consent on file  []Unable to leave detailed message as VM not approved on Communication consent       Pharmacy advised by    [x]Fax  []Phone call  []Secure Chat    Specialty Pharmacy    []     Approval letter scanned into [employer] Yes

## 2025-06-27 ENCOUNTER — TELEPHONE (OUTPATIENT)
Age: 85
End: 2025-06-27

## 2025-06-27 ENCOUNTER — TELEPHONE (OUTPATIENT)
Dept: CARDIOLOGY CLINIC | Facility: CLINIC | Age: 85
End: 2025-06-27

## 2025-06-27 NOTE — TELEPHONE ENCOUNTER
Caller: Debra Hoenscheid,daughter    Doctor: Dr. Mcginnis    Reason for call: She is calling regarding the authorization for Leqvio. She received a call from [employer] that is was approved.  Please call her to discuss this.    Thank you    Call back#: [phone]

## 2025-06-27 NOTE — TELEPHONE ENCOUNTER
Received fax from [facility]     Left message patient voice mail to discuss Statement of Benefits to determine if patient would like to proceed with Leqvio    Patient is responsible for 20%coinsurance.  An out of pocket maximum of $1000 which $36.33 has been met.  Once met. Leqvio will then be covered at 100%.    Prior Auth is required as well.    Statement of benefits scanned to Epic.

## 2025-06-27 NOTE — TELEPHONE ENCOUNTER
Patient scheduled for first Leqvio injection Friday 7/18/25    [facility].  Requires Prior Authorization

## 2025-06-27 NOTE — TELEPHONE ENCOUNTER
Phone call to daughter Vannesa.    Gave her detailed information regarding the leqvio Statement of Benefits.    She scheduled first injection for her mother 7/18/25  Sent to [employer] team for Prior Authorization.

## 2025-07-11 ENCOUNTER — TELEPHONE (OUTPATIENT)
Dept: CARDIOLOGY CLINIC | Facility: CLINIC | Age: 85
End: 2025-07-11

## 2025-07-11 DIAGNOSIS — E78.49 OTHER HYPERLIPIDEMIA: Primary | ICD-10-CM

## 2025-07-11 RX ORDER — EZETIMIBE 10 MG/1
10 TABLET ORAL DAILY
Qty: 90 TABLET | Refills: 3 | Status: SHIPPED | OUTPATIENT
Start: 2025-07-11

## 2025-07-11 NOTE — PROGRESS NOTES
Leqvio not covered without repatha failure    Daughter would prefer patient try zetia before repatha    Will rx zetia 10mg    Patient is intolerant to statins

## 2025-07-11 NOTE — TELEPHONE ENCOUNTER
Spoke with daughter Vannesa.  Requesting a change in Wegovy script.    Daughter states 0.25 dose does not seem to be helping curb her mothers appetite.  She is asking if dose can be upped to 1mg.  This was the last dose she was on before stopping Wegovy    Will discuss with Dr. Mcginnis

## 2025-07-12 DIAGNOSIS — E66.01 SEVERE OBESITY (BMI 35.0-39.9) WITH COMORBIDITY (HCC): Primary | ICD-10-CM

## 2025-07-14 NOTE — TELEPHONE ENCOUNTER
Phone call to daughter Vannesa.   Per Dr. Mcginnis, increased Wegovy to 1mg dose.    Daughter had picked up script over the weekdend.

## 2025-08-05 DIAGNOSIS — F32.89 OTHER DEPRESSION: ICD-10-CM

## 2025-08-07 RX ORDER — PAROXETINE 10 MG/1
20 TABLET, FILM COATED ORAL DAILY
Qty: 180 TABLET | Refills: 1 | Status: SHIPPED | OUTPATIENT
Start: 2025-08-07

## 2025-08-15 ENCOUNTER — OFFICE VISIT (OUTPATIENT)
Dept: FAMILY MEDICINE CLINIC | Facility: CLINIC | Age: 85
End: 2025-08-15
Payer: COMMERCIAL

## 2025-08-15 VITALS
TEMPERATURE: 98.6 F | RESPIRATION RATE: 16 BRPM | WEIGHT: 184.2 LBS | HEART RATE: 94 BPM | HEIGHT: 62 IN | OXYGEN SATURATION: 97 % | DIASTOLIC BLOOD PRESSURE: 74 MMHG | BODY MASS INDEX: 33.9 KG/M2 | SYSTOLIC BLOOD PRESSURE: 132 MMHG

## 2025-08-15 DIAGNOSIS — Z00.00 MEDICARE ANNUAL WELLNESS VISIT, SUBSEQUENT: ICD-10-CM

## 2025-08-15 DIAGNOSIS — N18.31 STAGE 3A CHRONIC KIDNEY DISEASE (HCC): ICD-10-CM

## 2025-08-15 DIAGNOSIS — R73.9 HYPERGLYCEMIA: ICD-10-CM

## 2025-08-15 DIAGNOSIS — E78.2 MIXED HYPERLIPIDEMIA: Primary | ICD-10-CM

## 2025-08-15 DIAGNOSIS — F32.89 OTHER DEPRESSION: ICD-10-CM

## 2025-08-15 PROCEDURE — 99214 OFFICE O/P EST MOD 30 MIN: CPT | Performed by: FAMILY MEDICINE

## 2025-08-15 PROCEDURE — G0439 PPPS, SUBSEQ VISIT: HCPCS | Performed by: FAMILY MEDICINE

## 2025-08-17 PROBLEM — R94.4 DECREASED GFR: Status: RESOLVED | Noted: 2021-12-17 | Resolved: 2025-08-17
